# Patient Record
Sex: FEMALE | Race: WHITE | Employment: UNEMPLOYED | URBAN - METROPOLITAN AREA
[De-identification: names, ages, dates, MRNs, and addresses within clinical notes are randomized per-mention and may not be internally consistent; named-entity substitution may affect disease eponyms.]

---

## 2018-01-19 ENCOUNTER — HOSPITAL ENCOUNTER (EMERGENCY)
Facility: CLINIC | Age: 43
Discharge: HOME | End: 2018-01-20
Attending: EMERGENCY MEDICINE | Admitting: EMERGENCY MEDICINE
Payer: COMMERCIAL

## 2018-01-19 DIAGNOSIS — R07.89 NON-CARDIAC CHEST PAIN: ICD-10-CM

## 2018-01-19 PROCEDURE — 99283 EMERGENCY DEPT VISIT LOW MDM: CPT

## 2018-01-19 PROCEDURE — 93005 ELECTROCARDIOGRAM TRACING: CPT

## 2018-01-19 NOTE — ED AVS SNAPSHOT
[facility] Emergency Department    [address]    [facility] [address]    Phone:  [phone]    Fax:  [phone]                                       Amanda Cox   MRN: [id number]    Department:  [facility] Emergency Department   Date of Visit:  1/19/2018           Patient Information     Date Of Birth          1975        Your diagnoses for this visit were:     Non-cardiac chest pain        You were seen by Hammad Sherwood MD.      Follow-up Information     Follow up with Suyapa Lieberman DO.    Why:  As needed    Contact information:    [facility]  [address]  [facility] [address]  [phone]          Discharge Instructions       Discharge Instructions  Chest Pain    You have been seen today for chest pain or discomfort.  At this time, your provider has found no signs that your chest pain is due to a serious or life-threatening condition, (or you have declined more testing and/or admission to the hospital). However, sometimes there is a serious problem that does not show up right away. Your evaluation today may not be complete and you may need further testing and evaluation.     Generally, every Emergency Department visit should have a follow-up clinic visit with either a primary or a specialty clinic/provider. Please follow-up as instructed by your emergency provider today.  Return to the Emergency Department if:    Your chest pain changes, gets worse, starts to happen more often, or comes with less activity.    You are newly short of breath.    You get very weak or tired.    You pass out or faint.    You have any new symptoms, like fever, cough, numb legs, or you cough up blood.    You have anything else that worries you.    Until you follow-up with your regular provider, please do the following:    Take one aspirin daily unless you have an allergy or are told not to by your provider.    If a stress test appointment has been made, go to  the appointment.    If you have questions, contact your regular provider.    Follow-up with your regular provider/clinic as directed; this is very important.    If you were given a prescription for medicine here today, be sure to read all of the information (including the package insert) that comes with your prescription.  This will include important information about the medicine, its side effects, and any warnings that you need to know about.  The pharmacist who fills the prescription can provide more information and answer questions you may have about the medicine.  If you have questions or concerns that the pharmacist cannot address, please call or return to the Emergency Department.       Remember that you can always come back to the Emergency Department if you are not able to see your regular provider in the amount of time listed above, if you get any new symptoms, or if there is anything that worries you.      24 Hour Appointment Hotline       To make an appointment at any [facility], call [phone] ([phone]). If you don't have a family doctor or clinic, we will help you find one. [facility] are conveniently located to serve the needs of you and your family.             Review of your medicines      Notice     You have not been prescribed any medications.            Procedures and tests performed during your visit     Basic metabolic panel    CBC with platelets differential    Cardiac Continuous Monitoring    D dimer quantitative    EKG 12 lead    Peripheral IV: Standard    Pulse oximetry nursing    Troponin I      Orders Needing Specimen Collection     None      Pending Results     No orders found for last 3 day(s).            Pending Culture Results     No orders found for last 3 day(s).            Pending Results Instructions     If you had any lab results that were not finalized at the time of your Discharge, you can call the ED Lab Result RN at [phone]. You will be contacted  by this team for any positive Lab results or changes in treatment. The nurses are available 7 days a week from 10A to 6:30P.  You can leave a message 24 hours per day and they will return your call.        Test Results From Your Hospital Stay        1/20/2018 12:30 AM      Component Results     Component Value Ref Range & Units Status    WBC 7.4 4.0 - 11.0 10e9/L Final    RBC Count 4.12 3.8 - 5.2 10e12/L Final    Hemoglobin 12.0 11.7 - 15.7 g/dL Final    Hematocrit 37.1 35.0 - 47.0 % Final    MCV 90 78 - 100 fl Final    MCH 29.1 26.5 - 33.0 pg Final    MCHC 32.3 31.5 - 36.5 g/dL Final    RDW 13.5 10.0 - 15.0 % Final    Platelet Count 199 150 - 450 10e9/L Final    Diff Method Automated Method  Final    % Neutrophils 63.8 % Final    % Lymphocytes 26.0 % Final    % Monocytes 8.8 % Final    % Eosinophils 0.8 % Final    % Basophils 0.3 % Final    % Immature Granulocytes 0.3 % Final    Nucleated RBCs 0 0 /100 Final    Absolute Neutrophil 4.7 1.6 - 8.3 10e9/L Final    Absolute Lymphocytes 1.9 0.8 - 5.3 10e9/L Final    Absolute Monocytes 0.7 0.0 - 1.3 10e9/L Final    Absolute Eosinophils 0.1 0.0 - 0.7 10e9/L Final    Absolute Basophils 0.0 0.0 - 0.2 10e9/L Final    Abs Immature Granulocytes 0.0 0 - 0.4 10e9/L Final    Absolute Nucleated RBC 0.0  Final         1/20/2018 12:49 AM      Component Results     Component Value Ref Range & Units Status    Sodium 139 133 - 144 mmol/L Final    Potassium 3.6 3.4 - 5.3 mmol/L Final    Chloride 106 94 - 109 mmol/L Final    Carbon Dioxide 27 20 - 32 mmol/L Final    Anion Gap 6 3 - 14 mmol/L Final    Glucose 97 70 - 99 mg/dL Final    Urea Nitrogen 15 7 - 30 mg/dL Final    Creatinine 0.61 0.52 - 1.04 mg/dL Final    GFR Estimate >90 >60 mL/min/1.7m2 Final    Non  GFR Calc    GFR Estimate If Black >90 >60 mL/min/1.7m2 Final    African American GFR Calc    Calcium 9.0 8.5 - 10.1 mg/dL Final         1/20/2018 12:49 AM      Component Results     Component Value Ref Range & Units  Status    Troponin I ES <0.015 0.000 - 0.045 ug/L Final    The 99th percentile for upper reference range is 0.045 ug/L.  Troponin values   in the range of 0.045 - 0.120 ug/L may be associated with risks of adverse   clinical events.           1/20/2018 12:47 AM      Component Results     Component Value Ref Range & Units Status    D Dimer <0.3 0.0 - 0.50 ug/ml FEU Final    This D-dimer assay is intended for use in conjunction with a clinical pretest   probability assessment model to exclude pulmonary embolism (PE) and deep   venous thrombosis (DVT) in outpatients suspected of PE or DVT. The cut-off   value is 0.5 ug/mL FEU.                  Clinical Quality Measure: Blood Pressure Screening     Your blood pressure was checked while you were in the emergency department today. The last reading we obtained was  BP: (!) 137/97 . Please read the guidelines below about what these numbers mean and what you should do about them.  If your systolic blood pressure (the top number) is less than 120 and your diastolic blood pressure (the bottom number) is less than 80, then your blood pressure is normal. There is nothing more that you need to do about it.  If your systolic blood pressure (the top number) is 120-139 or your diastolic blood pressure (the bottom number) is 80-89, your blood pressure may be higher than it should be. You should have your blood pressure rechecked within a year by a primary care provider.  If your systolic blood pressure (the top number) is 140 or greater or your diastolic blood pressure (the bottom number) is 90 or greater, you may have high blood pressure. High blood pressure is treatable, but if left untreated over time it can put you at risk for heart attack, stroke, or kidney failure. You should have your blood pressure rechecked by a primary care provider within the next 4 weeks.  If your provider in the emergency department today gave you specific instructions to follow-up with your doctor or  "provider even sooner than that, you should follow that instruction and not wait for up to 4 weeks for your follow-up visit.        Thank you for choosing [facility]       Thank you for choosing [facility] for your care. Our goal is always to provide you with excellent care. Hearing back from our patients is one way we can continue to improve our services. Please take a few minutes to complete the written survey that you may receive in the mail after you visit with us. Thank you!        [employer] Information     [facility] lets you send messages to your doctor, view your test results, renew your prescriptions, schedule appointments and more. To sign up, go to [email or web address] . Click on \"Log in\" on the left side of the screen, which will take you to the Welcome page. Then click on \"Sign up Now\" on the right side of the page.     You will be asked to enter the access code listed below, as well as some personal information. Please follow the directions to create your username and password.     Your access code is: [email or web address]  Expires: 2018  1:22 AM     Your access code will  in 90 days. If you need help or a new code, please call your [facility] clinic or [phone].        Care EveryWhere ID     This is your Care EveryWhere ID. This could be used by other organizations to access your [facility] medical records  [id number]        Equal Access to Services     CLARISSE PEREZ AH: Hadii leigha Barton, waaxda luqadaha, qaybta kaalcory wilson. So [facility] [phone].    ATENCIÓN: Si habla español, tiene a mae disposición servicios gratuitos de asistencia lingüística. John al [phone].    We comply with applicable federal civil rights laws and [place] laws. We do not discriminate on the basis of race, color, national origin, age, disability, sex, sexual orientation, or gender identity.            After Visit Summary       This is your record. Keep this with " you and show to your community pharmacist(s) and doctor(s) at your next visit.

## 2018-01-19 NOTE — ED AVS SNAPSHOT
[facility] Emergency Department    [address]    [facility] [address]    Phone:  [phone]    Fax:  [phone]                                       Amanda Cox   MRN: [id number]    Department:  [facility] Emergency Department   Date of Visit:  1/19/2018           After Visit Summary Signature Page     I have received my discharge instructions, and my questions have been answered. I have discussed any challenges I see with this plan with the nurse or doctor.    ..........................................................................................................................................  Patient/Patient Representative Signature      ..........................................................................................................................................  Patient Representative Print Name and Relationship to Patient    ..................................................               ................................................  Date                                            Time    ..........................................................................................................................................  Reviewed by Signature/Title    ...................................................              ..............................................  Date                                                            Time

## 2018-01-20 VITALS
OXYGEN SATURATION: 98 % | WEIGHT: 147 LBS | TEMPERATURE: 98.2 F | RESPIRATION RATE: 18 BRPM | SYSTOLIC BLOOD PRESSURE: 137 MMHG | HEART RATE: 71 BPM | DIASTOLIC BLOOD PRESSURE: 97 MMHG

## 2018-01-20 LAB
ANION GAP SERPL CALCULATED.3IONS-SCNC: 6 MMOL/L (ref 3–14)
BASOPHILS # BLD AUTO: 0 10E9/L (ref 0–0.2)
BASOPHILS NFR BLD AUTO: 0.3 %
BUN SERPL-MCNC: 15 MG/DL (ref 7–30)
CALCIUM SERPL-MCNC: 9 MG/DL (ref 8.5–10.1)
CHLORIDE SERPL-SCNC: 106 MMOL/L (ref 94–109)
CO2 SERPL-SCNC: 27 MMOL/L (ref 20–32)
CREAT SERPL-MCNC: 0.61 MG/DL (ref 0.52–1.04)
D DIMER PPP FEU-MCNC: <0.3 UG/ML FEU (ref 0–0.5)
DIFFERENTIAL METHOD BLD: NORMAL
EOSINOPHIL # BLD AUTO: 0.1 10E9/L (ref 0–0.7)
EOSINOPHIL NFR BLD AUTO: 0.8 %
ERYTHROCYTE [DISTWIDTH] IN BLOOD BY AUTOMATED COUNT: 13.5 % (ref 10–15)
GFR SERPL CREATININE-BSD FRML MDRD: >90 ML/MIN/1.7M2
GLUCOSE SERPL-MCNC: 97 MG/DL (ref 70–99)
HCT VFR BLD AUTO: 37.1 % (ref 35–47)
HGB BLD-MCNC: 12 G/DL (ref 11.7–15.7)
IMM GRANULOCYTES # BLD: 0 10E9/L (ref 0–0.4)
IMM GRANULOCYTES NFR BLD: 0.3 %
LYMPHOCYTES # BLD AUTO: 1.9 10E9/L (ref 0.8–5.3)
LYMPHOCYTES NFR BLD AUTO: 26 %
MCH RBC QN AUTO: 29.1 PG (ref 26.5–33)
MCHC RBC AUTO-ENTMCNC: 32.3 G/DL (ref 31.5–36.5)
MCV RBC AUTO: 90 FL (ref 78–100)
MONOCYTES # BLD AUTO: 0.7 10E9/L (ref 0–1.3)
MONOCYTES NFR BLD AUTO: 8.8 %
NEUTROPHILS # BLD AUTO: 4.7 10E9/L (ref 1.6–8.3)
NEUTROPHILS NFR BLD AUTO: 63.8 %
NRBC # BLD AUTO: 0 10*3/UL
NRBC BLD AUTO-RTO: 0 /100
PLATELET # BLD AUTO: 199 10E9/L (ref 150–450)
POTASSIUM SERPL-SCNC: 3.6 MMOL/L (ref 3.4–5.3)
RBC # BLD AUTO: 4.12 10E12/L (ref 3.8–5.2)
SODIUM SERPL-SCNC: 139 MMOL/L (ref 133–144)
TROPONIN I SERPL-MCNC: <0.015 UG/L (ref 0–0.04)
WBC # BLD AUTO: 7.4 10E9/L (ref 4–11)

## 2018-01-20 PROCEDURE — 80048 BASIC METABOLIC PNL TOTAL CA: CPT | Performed by: EMERGENCY MEDICINE

## 2018-01-20 PROCEDURE — 85379 FIBRIN DEGRADATION QUANT: CPT | Performed by: EMERGENCY MEDICINE

## 2018-01-20 PROCEDURE — 84484 ASSAY OF TROPONIN QUANT: CPT | Performed by: EMERGENCY MEDICINE

## 2018-01-20 PROCEDURE — 85025 COMPLETE CBC W/AUTO DIFF WBC: CPT | Performed by: EMERGENCY MEDICINE

## 2018-01-20 ASSESSMENT — ENCOUNTER SYMPTOMS
FEVER: 0
NUMBNESS: 1
SHORTNESS OF BREATH: 0

## 2018-01-20 NOTE — ED NOTES
42-year-old female presents to the ER with complaints of chest pain that has been on and off for the last couple of days but now the pain is down in her left arm.

## 2018-01-20 NOTE — ED PROVIDER NOTES
"  History     Chief Complaint:  Chest Pain    HPI   Amanda Cox is a 42 year old female with a history of hypertension who presents with chest pain.   The patient states that after waking up Monday morning she developed intermittent chest pain that lasts for a few hours and has been progressively worsening until today where it is more persistent and sharp. Three hours prior to presentation, the patient reports that her chest pain increased significantly with radiation down her left arm. The patient reports her pain across her chest bilaterally with a pain scale of 6/10. She reports to have tried yoga to help the pain pass. She also reports to have a \"chronic loss of circulation in her fingers\" that is making her hands and feet feel numb today. The patient denies fever, difficulty breathing, shortness of breath, and any swelling.     Allergies:  Codeine     Medications:    The patient is currently on no regular medications.    Past Medical History:    PFO  Hypertension  Anxiety  TIA    Past Surgical History:    History reviewed. No pertinent surgical history.    Family History:    History reviewed. No pertinent family history.      Social History:  Tobacco Status: Never  Alcohol Use: No     Review of Systems   Constitutional: Negative for fever.   Respiratory: Negative for shortness of breath.    Cardiovascular: Positive for chest pain. Negative for leg swelling (denies any extremity swelling).   Neurological: Positive for numbness (hands and feet).   All other systems reviewed and are negative.    Physical Exam     Patient Vitals for the past 24 hrs:   BP Temp Temp src Pulse Heart Rate Resp SpO2 Weight   01/20/18 0100 (!) 137/97 - - - 76 - 98 % -   01/19/18 2251 (!) 155/107 98.2  F (36.8  C) Temporal 71 - 18 99 % 66.7 kg (147 lb)       Physical Exam  Nursing note and vitals reviewed.  Constitutional: Cooperative.   HENT:   Mouth/Throat: Mucous membranes are normal. No JVD  Cardiovascular: Normal rate, regular " rhythm and normal heart sounds.  No murmur.  Pulmonary/Chest: Effort normal and breath sounds normal. No respiratory distress. No wheezes. No rales.   Abdominal: Soft. Normal appearance and bowel sounds are normal. No distension. There is no tenderness. There is no rigidity and no guarding.   Musculoskeletal: No LE edema  Neurological: Alert.   Skin: Skin is warm and dry. No rash noted.   Psychiatric: Normal mood and affect.       Emergency Department Course     ECG (22:58:33):  Rate 62 bpm. IL interval 148. QRS duration 82. QT/QTc 418/424. P-R-T axes 59 70 37. Interpretation: Normal Sinus Rhythm. Interpreted at 2301 by Hammad Sherwood MD on 1/19/2018+.    Laboratory:  0009 - Troponin: <0.015   D-dimer: <0.3    CBC: WNL (WBC 7.4, HGB 12.0, )    BMP: WNL (Creatinine 0.61)     Emergency Department Course:  Past medical records, nursing notes, and vitals reviewed.  2334: I performed an exam of the patient and obtained history, as documented above.   IV inserted and blood drawn.     0116: I rechecked the patient. Findings and plan explained to the Patient. Patient discharged home with instructions regarding supportive care, medications, and reasons to return. The importance of close follow-up was reviewed.      Impression & Plan      Medical Decision Making:  Amanda Cox is a 42 year old female with a history of TIA and a known PFO presents with intermittent chest pain. Story is nonconcerning as it is non exertional or typical of cardiac related etiologies. Her dimer is negative, and this lower risk patient essentially rules out pulmonary embolism. She did have a preceding upper respiratory infection and may simply be  having inflammation of chest wall or pleurisy. No indication for chest imaging at this time. Troponin and EKG are reassuring. Exam is unremarkable. No murmurs, rubs or concern clinically for effusion or pericarditis. At this time she is stable for discharge and will follow up with her regular  physician.     Diagnosis:    ICD-10-CM    1. Non-cardiac chest pain R07.89      Lukas Conte  1/19/2018   [facility] EMERGENCY DEPARTMENT  I, Lukas Conte, am serving as a scribe at 11:34 PM on 1/19/2018 to document services personally performed by Hammad Sherwood MD based on my observations and the provider's statements to me.         Hammad Sherwood MD  01/20/18 0606

## 2018-01-21 LAB — INTERPRETATION ECG - MUSE: NORMAL

## 2018-02-20 ENCOUNTER — HOSPITAL ENCOUNTER (EMERGENCY)
Facility: CLINIC | Age: 43
Discharge: HOME | End: 2018-02-20
Attending: EMERGENCY MEDICINE | Admitting: EMERGENCY MEDICINE
Payer: COMMERCIAL

## 2018-02-20 ENCOUNTER — APPOINTMENT (OUTPATIENT)
Dept: GENERAL RADIOLOGY | Facility: CLINIC | Age: 43
End: 2018-02-20
Attending: EMERGENCY MEDICINE
Payer: COMMERCIAL

## 2018-02-20 VITALS
SYSTOLIC BLOOD PRESSURE: 128 MMHG | TEMPERATURE: 98.3 F | BODY MASS INDEX: 24.11 KG/M2 | HEART RATE: 70 BPM | RESPIRATION RATE: 18 BRPM | OXYGEN SATURATION: 100 % | HEIGHT: 66 IN | WEIGHT: 150 LBS | DIASTOLIC BLOOD PRESSURE: 90 MMHG

## 2018-02-20 DIAGNOSIS — R07.9 ACUTE CHEST PAIN: ICD-10-CM

## 2018-02-20 LAB
ANION GAP SERPL CALCULATED.3IONS-SCNC: 6 MMOL/L (ref 3–14)
BASOPHILS # BLD AUTO: 0 10E9/L (ref 0–0.2)
BASOPHILS NFR BLD AUTO: 0.3 %
BUN SERPL-MCNC: 17 MG/DL (ref 7–30)
CALCIUM SERPL-MCNC: 9 MG/DL (ref 8.5–10.1)
CHLORIDE SERPL-SCNC: 104 MMOL/L (ref 94–109)
CO2 SERPL-SCNC: 28 MMOL/L (ref 20–32)
CREAT SERPL-MCNC: 0.62 MG/DL (ref 0.52–1.04)
D DIMER PPP FEU-MCNC: <0.3 UG/ML FEU (ref 0–0.5)
DIFFERENTIAL METHOD BLD: NORMAL
EOSINOPHIL # BLD AUTO: 0.1 10E9/L (ref 0–0.7)
EOSINOPHIL NFR BLD AUTO: 0.6 %
ERYTHROCYTE [DISTWIDTH] IN BLOOD BY AUTOMATED COUNT: 12.8 % (ref 10–15)
GFR SERPL CREATININE-BSD FRML MDRD: >90 ML/MIN/1.7M2
GLUCOSE SERPL-MCNC: 94 MG/DL (ref 70–99)
HCT VFR BLD AUTO: 37.1 % (ref 35–47)
HGB BLD-MCNC: 11.9 G/DL (ref 11.7–15.7)
IMM GRANULOCYTES # BLD: 0 10E9/L (ref 0–0.4)
IMM GRANULOCYTES NFR BLD: 0.3 %
LYMPHOCYTES # BLD AUTO: 1.4 10E9/L (ref 0.8–5.3)
LYMPHOCYTES NFR BLD AUTO: 14.9 %
MCH RBC QN AUTO: 28.5 PG (ref 26.5–33)
MCHC RBC AUTO-ENTMCNC: 32.1 G/DL (ref 31.5–36.5)
MCV RBC AUTO: 89 FL (ref 78–100)
MONOCYTES # BLD AUTO: 0.8 10E9/L (ref 0–1.3)
MONOCYTES NFR BLD AUTO: 8.1 %
NEUTROPHILS # BLD AUTO: 7 10E9/L (ref 1.6–8.3)
NEUTROPHILS NFR BLD AUTO: 75.8 %
NRBC # BLD AUTO: 0 10*3/UL
NRBC BLD AUTO-RTO: 0 /100
PLATELET # BLD AUTO: 228 10E9/L (ref 150–450)
POTASSIUM SERPL-SCNC: 3.9 MMOL/L (ref 3.4–5.3)
RBC # BLD AUTO: 4.17 10E12/L (ref 3.8–5.2)
SODIUM SERPL-SCNC: 138 MMOL/L (ref 133–144)
TROPONIN I SERPL-MCNC: <0.015 UG/L (ref 0–0.04)
WBC # BLD AUTO: 9.3 10E9/L (ref 4–11)

## 2018-02-20 PROCEDURE — 84484 ASSAY OF TROPONIN QUANT: CPT | Performed by: EMERGENCY MEDICINE

## 2018-02-20 PROCEDURE — 71046 X-RAY EXAM CHEST 2 VIEWS: CPT

## 2018-02-20 PROCEDURE — 25000132 ZZH RX MED GY IP 250 OP 250 PS 637: Performed by: EMERGENCY MEDICINE

## 2018-02-20 PROCEDURE — 85025 COMPLETE CBC W/AUTO DIFF WBC: CPT | Performed by: EMERGENCY MEDICINE

## 2018-02-20 PROCEDURE — 99285 EMERGENCY DEPT VISIT HI MDM: CPT | Mod: 25

## 2018-02-20 PROCEDURE — 93005 ELECTROCARDIOGRAM TRACING: CPT

## 2018-02-20 PROCEDURE — 85379 FIBRIN DEGRADATION QUANT: CPT | Performed by: EMERGENCY MEDICINE

## 2018-02-20 PROCEDURE — 80048 BASIC METABOLIC PNL TOTAL CA: CPT | Performed by: EMERGENCY MEDICINE

## 2018-02-20 RX ORDER — PROPRANOLOL HYDROCHLORIDE 80 MG/1
80 CAPSULE, EXTENDED RELEASE ORAL
COMMUNITY
Start: 2017-08-14 | End: 2019-05-09

## 2018-02-20 RX ORDER — ASPIRIN 81 MG/1
324 TABLET, CHEWABLE ORAL ONCE
Status: DISCONTINUED | OUTPATIENT
Start: 2018-02-20 | End: 2018-02-20 | Stop reason: HOSPADM

## 2018-02-20 RX ORDER — NITROGLYCERIN 0.4 MG/1
0.4 TABLET SUBLINGUAL EVERY 5 MIN PRN
Status: DISCONTINUED | OUTPATIENT
Start: 2018-02-20 | End: 2018-02-20 | Stop reason: HOSPADM

## 2018-02-20 RX ADMIN — NITROGLYCERIN 0.4 MG: 0.4 TABLET SUBLINGUAL at 17:29

## 2018-02-20 ASSESSMENT — ENCOUNTER SYMPTOMS
SHORTNESS OF BREATH: 1
NAUSEA: 1
WEAKNESS: 1
ABDOMINAL PAIN: 0
VOMITING: 0
NECK PAIN: 1

## 2018-02-20 NOTE — ED AVS SNAPSHOT
[facility] Emergency Department    [address]    [facility] [address]    Phone:  [phone]    Fax:  [phone]                                       Amanda Cox   MRN: [id number]    Department:  [facility] Emergency Department   Date of Visit:  2/20/2018           Patient Information     Date Of Birth          1975        Your diagnoses for this visit were:     Acute chest pain        You were seen by Flavia Krueger MD.      Follow-up Information     Follow up with Suyapa Lieberman DO. Schedule an appointment as soon as possible for a visit in 1 week.    Contact information:    [facility]  [address]  [facility] [id number] [phone]          Follow up with cardiology appointment. Go in 1 day.        Discharge Instructions       Discharge Instructions  Chest Pain    You have been seen today for chest pain or discomfort.  At this time, your provider has found no signs that your chest pain is due to a serious or life-threatening condition, (or you have declined more testing and/or admission to the hospital). However, sometimes there is a serious problem that does not show up right away. Your evaluation today may not be complete and you may need further testing and evaluation.     Generally, every Emergency Department visit should have a follow-up clinic visit with either a primary or a specialty clinic/provider. Please follow-up as instructed by your emergency provider today.  Return to the Emergency Department if:    Your chest pain changes, gets worse, starts to happen more often, or comes with less activity.    You are newly short of breath.    You get very weak or tired.    You pass out or faint.    You have any new symptoms, like fever, cough, numb legs, or you cough up blood.    You have anything else that worries you.    Until you follow-up with your regular provider, please do the following:    Take one aspirin daily unless  you have an allergy or are told not to by your provider.    If a stress test appointment has been made, go to the appointment.    If you have questions, contact your regular provider.    Follow-up with your regular provider/clinic as directed; this is very important.    If you were given a prescription for medicine here today, be sure to read all of the information (including the package insert) that comes with your prescription.  This will include important information about the medicine, its side effects, and any warnings that you need to know about.  The pharmacist who fills the prescription can provide more information and answer questions you may have about the medicine.  If you have questions or concerns that the pharmacist cannot address, please call or return to the Emergency Department.       Remember that you can always come back to the Emergency Department if you are not able to see your regular provider in the amount of time listed above, if you get any new symptoms, or if there is anything that worries you.      24 Hour Appointment Hotline       To make an appointment at any [facility], call [phone] ([phone]). If you don't have a family doctor or clinic, we will help you find one. [facility] are conveniently located to serve the needs of you and your family.             Review of your medicines      Our records show that you are taking the medicines listed below. If these are incorrect, please call your family doctor or clinic.        Dose / Directions Last dose taken    IBUPROFEN PO        Refills:  0        propranolol 80 MG 24 hr capsule   Commonly known as:  INDERAL LA   Dose:  80 mg        Take 80 mg by mouth   Refills:  0        TYLENOL PO        Refills:  0                Procedures and tests performed during your visit     Basic metabolic panel    CBC with platelets differential    D dimer quantitative    EKG 12 lead    Troponin I    XR Chest 2 Views      Orders Needing  Specimen Collection     None      Pending Results     No orders found from 2/18/2018 to 2/21/2018.            Pending Culture Results     No orders found from 2/18/2018 to 2/21/2018.            Pending Results Instructions     If you had any lab results that were not finalized at the time of your Discharge, you can call the ED Lab Result RN at [phone]. You will be contacted by this team for any positive Lab results or changes in treatment. The nurses are available 7 days a week from 10A to 6:30P.  You can leave a message 24 hours per day and they will return your call.        Test Results From Your Hospital Stay        2/20/2018  5:08 PM      Component Results     Component Value Ref Range & Units Status    WBC 9.3 4.0 - 11.0 10e9/L Final    RBC Count 4.17 3.8 - 5.2 10e12/L Final    Hemoglobin 11.9 11.7 - 15.7 g/dL Final    Hematocrit 37.1 35.0 - 47.0 % Final    MCV 89 78 - 100 fl Final    MCH 28.5 26.5 - 33.0 pg Final    MCHC 32.1 31.5 - 36.5 g/dL Final    RDW 12.8 10.0 - 15.0 % Final    Platelet Count 228 150 - 450 10e9/L Final    Diff Method Automated Method  Final    % Neutrophils 75.8 % Final    % Lymphocytes 14.9 % Final    % Monocytes 8.1 % Final    % Eosinophils 0.6 % Final    % Basophils 0.3 % Final    % Immature Granulocytes 0.3 % Final    Nucleated RBCs 0 0 /100 Final    Absolute Neutrophil 7.0 1.6 - 8.3 10e9/L Final    Absolute Lymphocytes 1.4 0.8 - 5.3 10e9/L Final    Absolute Monocytes 0.8 0.0 - 1.3 10e9/L Final    Absolute Eosinophils 0.1 0.0 - 0.7 10e9/L Final    Absolute Basophils 0.0 0.0 - 0.2 10e9/L Final    Abs Immature Granulocytes 0.0 0 - 0.4 10e9/L Final    Absolute Nucleated RBC 0.0  Final         2/20/2018  5:25 PM      Component Results     Component Value Ref Range & Units Status    Sodium 138 133 - 144 mmol/L Final    Potassium 3.9 3.4 - 5.3 mmol/L Final    Chloride 104 94 - 109 mmol/L Final    Carbon Dioxide 28 20 - 32 mmol/L Final    Anion Gap 6 3 - 14 mmol/L Final    Glucose 94 70  - 99 mg/dL Final    Urea Nitrogen 17 7 - 30 mg/dL Final    Creatinine 0.62 0.52 - 1.04 mg/dL Final    GFR Estimate >90 >60 mL/min/1.7m2 Final    Non  GFR Calc    GFR Estimate If Black >90 >60 mL/min/1.7m2 Final    African American GFR Calc    Calcium 9.0 8.5 - 10.1 mg/dL Final         2/20/2018  5:25 PM      Component Results     Component Value Ref Range & Units Status    Troponin I ES <0.015 0.000 - 0.045 ug/L Final    The 99th percentile for upper reference range is 0.045 ug/L.  Troponin values   in the range of 0.045 - 0.120 ug/L may be associated with risks of adverse   clinical events.           2/20/2018  5:46 PM      Component Results     Component Value Ref Range & Units Status    D Dimer <0.3 0.0 - 0.50 ug/ml FEU Final    This D-dimer assay is intended for use in conjunction with a clinical pretest   probability assessment model to exclude pulmonary embolism (PE) and deep   venous thrombosis (DVT) in outpatients suspected of PE or DVT. The cut-off   value is 0.5 ug/mL FEU.           2/20/2018  6:36 PM      Narrative     XR CHEST 2 VW 2/20/2018 6:26 PM    COMPARISON: None.    HISTORY: Shortness of breath, chest pain.        Impression     IMPRESSION: Cardiac silhouette and pulmonary vasculature are within  normal limits. No focal airspace disease, pleural effusion or  pneumothorax.    VISHAL HOLLIS MD                Clinical Quality Measure: Blood Pressure Screening     Your blood pressure was checked while you were in the emergency department today. The last reading we obtained was  BP: 128/90 . Please read the guidelines below about what these numbers mean and what you should do about them.  If your systolic blood pressure (the top number) is less than 120 and your diastolic blood pressure (the bottom number) is less than 80, then your blood pressure is normal. There is nothing more that you need to do about it.  If your systolic blood pressure (the top number) is 120-139 or your diastolic  "blood pressure (the bottom number) is 80-89, your blood pressure may be higher than it should be. You should have your blood pressure rechecked within a year by a primary care provider.  If your systolic blood pressure (the top number) is 140 or greater or your diastolic blood pressure (the bottom number) is 90 or greater, you may have high blood pressure. High blood pressure is treatable, but if left untreated over time it can put you at risk for heart attack, stroke, or kidney failure. You should have your blood pressure rechecked by a primary care provider within the next 4 weeks.  If your provider in the emergency department today gave you specific instructions to follow-up with your doctor or provider even sooner than that, you should follow that instruction and not wait for up to 4 weeks for your follow-up visit.        Thank you for choosing [facility]       Thank you for choosing [facility] for your care. Our goal is always to provide you with excellent care. Hearing back from our patients is one way we can continue to improve our services. Please take a few minutes to complete the written survey that you may receive in the mail after you visit with us. Thank you!        [employer] Information     [employer] lets you send messages to your doctor, view your test results, renew your prescriptions, schedule appointments and more. To sign up, go to [email or web address] . Click on \"Log in\" on the left side of the screen, which will take you to the Welcome page. Then click on \"Sign up Now\" on the right side of the page.     You will be asked to enter the access code listed below, as well as some personal information. Please follow the directions to create your username and password.     Your access code is: [email or web address]  Expires: 2018  1:22 AM     Your access code will  in 90 days. If you need help or a new code, please call your [facility] clinic or [phone].        Care EveryWhere ID     This is your Care " EveryWhere ID. This could be used by other organizations to access your [facility] medical records  [id number]        Equal Access to Services     CLARISSE PEREZ : Junaid Barton, gabriella mario, tina luna, cory horan. So [facility] [phone].    ATENCIÓN: Si habla español, tiene a mae disposición servicios gratuitos de asistencia lingüística. Llame al [phone].    We comply with applicable federal civil rights laws and [place] laws. We do not discriminate on the basis of race, color, national origin, age, disability, sex, sexual orientation, or gender identity.            After Visit Summary       This is your record. Keep this with you and show to your community pharmacist(s) and doctor(s) at your next visit.

## 2018-02-20 NOTE — ED PROVIDER NOTES
History     Chief Complaint:  Chest Pain    HPI   Amanda Cox is a 42 year old female who with a history of hypertension who presents to the ED for evaluation of chest pain. The patient reports that she has had 5 episodes of a sharp, left sided chest pain over the last month that radiates to her left neck and left arm. She has been seen here, and has had a negative workup.  She has an appointment with a cardiologist tomorrow at [facility]. 3 days ago at 0300, she had another episode. Her pain has been constant since then, so she presented to the ED. After this episode, she is also now having some left arm weakness, nausea, and shortness of breath. Her pain does not worsen with deep breathing or exertion. Of note, her episodes last 1-2 hours, which are followed by a dull, achy pain. She denies any leg swelling, rashes, abdominal pain, or vomiting. Not much improvement with change in position. She has been alternating tylenol and Advil, without relief.     Cardiac/PE/DVT Risk Factors:  The patient has a history of hypertension. The patient denies any personal or familial history of PE, DVT, or clotting disorder. The patient reports no recent travel, surgery, or other immobilizations. She is not on any birth control.    Allergies:  Codeine    Medications:    The patient is currently on no regular medications.    Past Medical History:    HTN  Ovarian cyst  PFO  TIA    Past Surgical History:    Gyn surgery    Family History:    No past pertinent family history.    Social History:  Marital Status:   [2]  Negative for tobacco use.  Rare alcohol use    Review of Systems   Respiratory: Positive for shortness of breath.    Cardiovascular: Positive for chest pain. Negative for leg swelling.   Gastrointestinal: Positive for nausea. Negative for abdominal pain and vomiting.   Musculoskeletal: Positive for neck pain.   Skin: Negative for rash.   Neurological: Positive for weakness.   All other systems reviewed and are  "negative.    Physical Exam     Patient Vitals for the past 24 hrs:   BP Temp Temp src Pulse Heart Rate Resp SpO2 Height Weight   02/20/18 1812 - - - - - - 99 % - -   02/20/18 1811 - - - - - - 100 % - -   02/20/18 1756 128/90 - - - 55 - - - -   02/20/18 1755 - - - - - - 100 % - -   02/20/18 1654 (!) 159/95 98.3  F (36.8  C) Oral 70 70 18 100 % 1.676 m (5' 6\") 68 kg (150 lb)     Physical Exam  Constitutional: Alert, attentive, GCS 15, young woman in no acute distress  HENT:    Nose: Nose normal.    Mouth/Throat: Oropharynx is clear, mucous membranes are moist   Eyes: Normal conjunctiva. Pupils are equal, round, and reactive to light.   CV: regular rate and rhythm; no murmurs, rubs or gallups  Chest: Effort normal and breath sounds normal. Reproducible chest wall tenderness in lower sternum in midline.  No rash noted.    GI:  There is no tenderness with deep palpation. No distension. Normal bowel sounds  MSK: Normal range of motion, no peripheral edema, no calf tenderness to palpation.   Neurological: Alert, attentive, oriented x4, strength intact  Skin: Skin is warm and dry.    Emergency Department Course   ECG:  Indication: Chest Pain  Time: 1652  Vent. Rate 62 bpm. LA interval 162. QRS duration 82. QT/QTc 398/403. P-R-T axis 70 77 61.  Normal sinus rhythm. No significant change compared to EKG dated 1/19/18. Read time: 1654    Imaging:  Radiographic findings were communicated with the patient who voiced understanding of the findings.  XR Chest 2 views:   Cardiac silhouette and pulmonary vasculature are within  normal limits. No focal airspace disease, pleural effusion or pneumothorax, as per radiology.     Laboratory:  CBC: WBC: 9.3, HGB: 11.9, PLT: 228  BMP: All WNL (Creatinine: 0.62)    1725 Troponin: <0.015    D dimer: <0.3    Interventions:  1729 Nitroglycerin 0.4 mg PO    Emergency Department Course:  Nursing notes and vitals reviewed. (2773) I performed an exam of the patient as documented above.     IV " inserted. Medicine administered as documented above. Blood drawn. This was sent to the lab for further testing, results above.    The patient was sent for a Chest XR while in the emergency department, findings above.     EKG obtained in the ED, see results above.     (1838) I rechecked the patient and discussed the results of her workup thus far.     Findings and plan explained to the Patient. Patient discharged home with instructions regarding supportive care, medications, and reasons to return. The importance of close follow-up was reviewed.    I personally reviewed the laboratory results with the Patient and answered all related questions prior to discharge.     Impression & Plan      Medical Decision Making:  Amanda Cox is a 42 year old female with history of TIA, PFO, and HTN, who presents with constant chest pain for the past 3 days.  Differential diagnosis includes acute coronary syndrome, angina, pulmonary embolism, pneumonia, pneumothorax, costochondritis, myocarditis, pericarditis.  EKG is reassuring without any acute ST segment changes.  Her troponin level is normal.  I feel a single troponin level is adequate as she has had constant chest pain for the past 3 days.  She is a low risk for pulmonary embolism by Wells score and d-dimer is negative.  Therefore CT chest was not obtained.  Chest x-ray does not show any evidence of pneumonia or pneumothorax.  Patient had improvement of symptoms with nitroglycerin.  She is chest pain-free.  She has a follow-up appointment tomorrow with a cardiologist at [facility].  I discussed that she should keep this appointment and discuss her recent episodes of chest pain with the cardiologist.  Return precautions discussed.  All questions were answered.  She was discharged home in stable condition.    Diagnosis:    ICD-10-CM   1. Acute chest pain R07.9     Disposition:  discharged to home    Scribe Disclosure:  Kaelyn AGUILAR, am serving as a scribe on 2/20/2018 at 4:58  PM to personally document services performed by Flavia Krueger MD based on my observations and the provider's statements to me.     Kaelyn Schwartz  2/20/2018   [facility] EMERGENCY DEPARTMENT       Flavia Krueger MD  02/21/18 1056

## 2018-02-20 NOTE — ED AVS SNAPSHOT
[facility] Emergency Department    [address]    [facility] [address]    Phone:  [phone]    Fax:  [phone]                                       Amanda Cox   MRN: [id number]    Department:  [facility] Emergency Department   Date of Visit:  2/20/2018           After Visit Summary Signature Page     I have received my discharge instructions, and my questions have been answered. I have discussed any challenges I see with this plan with the nurse or doctor.    ..........................................................................................................................................  Patient/Patient Representative Signature      ..........................................................................................................................................  Patient Representative Print Name and Relationship to Patient    ..................................................               ................................................  Date                                            Time    ..........................................................................................................................................  Reviewed by Signature/Title    ...................................................              ..............................................  Date                                                            Time

## 2018-02-20 NOTE — ED NOTES
Pt states taking propranolol and that she was told to not take aspirin while on that medication since it can make it less effective. Unsure if she should take the prescribed aspirin. Explained to pt that I would check with MD.

## 2018-02-20 NOTE — ED NOTES
"Pt states chest pain that starts in middle of chest and goes down left arm and up left side of neck. Pt states she has been having these attacks where she will get intense pain for a couple hours and then it will go away and states it feels \"achy\" for the couple days after. Pt states having SOB since Saturday especially when laying down along with dizziness/lightheaded. Pt also states experiencing nausea for past 24 hours. Pt states having appointment with cardiologist tomorrow but states pain was getting worse so thought she should be seen.  "

## 2018-02-21 LAB — INTERPRETATION ECG - MUSE: NORMAL

## 2018-02-21 NOTE — ED NOTES
Pt provided with discharge paperwork and educated on recommended follow-up with PCP and cardiologist. Pt educated on how to manage symptoms at home. Pt voiced understanding and denied any questions at discharge. Pt ambulated to lobby with .

## 2018-07-12 ENCOUNTER — OFFICE VISIT (OUTPATIENT)
Dept: FAMILY MEDICINE | Facility: CLINIC | Age: 43
End: 2018-07-12

## 2018-07-12 VITALS
WEIGHT: 150.2 LBS | HEART RATE: 73 BPM | SYSTOLIC BLOOD PRESSURE: 114 MMHG | OXYGEN SATURATION: 99 % | DIASTOLIC BLOOD PRESSURE: 86 MMHG | BODY MASS INDEX: 25.02 KG/M2 | TEMPERATURE: 98.2 F | HEIGHT: 65 IN

## 2018-07-12 DIAGNOSIS — R25.2 CRAMP OF LIMB: ICD-10-CM

## 2018-07-12 DIAGNOSIS — Z71.89 ACP (ADVANCE CARE PLANNING): ICD-10-CM

## 2018-07-12 DIAGNOSIS — M54.50 ACUTE LEFT-SIDED LOW BACK PAIN WITHOUT SCIATICA: Primary | ICD-10-CM

## 2018-07-12 DIAGNOSIS — Z87.442 HISTORY OF KIDNEY STONES: ICD-10-CM

## 2018-07-12 DIAGNOSIS — Z76.89 HEALTH CARE HOME: ICD-10-CM

## 2018-07-12 LAB
ALBUMIN (URINE): ABNORMAL MG/DL
APPEARANCE UR: CLEAR
BACTERIA, UR: ABNORMAL
BILIRUB UR QL: ABNORMAL
CASTS/LPF: ABNORMAL
COLOR UR: YELLOW
EP/HPF: ABNORMAL
ERYTHROCYTE [DISTWIDTH] IN BLOOD BY AUTOMATED COUNT: 15.3 %
GLUCOSE URINE: ABNORMAL MG/DL
HCT VFR BLD AUTO: 35.7 % (ref 35–47)
HEMOGLOBIN: 11.6 G/DL (ref 11.7–15.7)
HGB UR QL: ABNORMAL
KETONES UR QL: 15 MG/DL
LEUKOCYTE ESTERASE - QUEST: ABNORMAL
MCH RBC QN AUTO: 26.7 PG (ref 26–33)
MCHC RBC AUTO-ENTMCNC: 32.5 G/DL (ref 31–36)
MCV RBC AUTO: 82.2 FL (ref 78–100)
MISC.: ABNORMAL
NITRITE UR QL STRIP: ABNORMAL
PH UR STRIP: 7 PH (ref 5–7)
PLATELET COUNT - QUEST: 173 10^9/L (ref 150–375)
RBC # BLD AUTO: 4.34 10*12/L (ref 3.8–5.2)
RBC, UR MICRO: ABNORMAL (ref ?–2)
SP. GRAVITY: 1.02
UROBILINOGEN UR QL STRIP: 0.2 EU/DL (ref 0.2–1)
WBC # BLD AUTO: 7 10*9/L (ref 4–11)
WBC, UR MICRO: ABNORMAL (ref ?–2)

## 2018-07-12 PROCEDURE — 87086 URINE CULTURE/COLONY COUNT: CPT | Mod: 90 | Performed by: PHYSICIAN ASSISTANT

## 2018-07-12 PROCEDURE — 99202 OFFICE O/P NEW SF 15 MIN: CPT | Performed by: PHYSICIAN ASSISTANT

## 2018-07-12 PROCEDURE — 87186 SC STD MICRODIL/AGAR DIL: CPT | Mod: 90 | Performed by: PHYSICIAN ASSISTANT

## 2018-07-12 PROCEDURE — 85027 COMPLETE CBC AUTOMATED: CPT | Performed by: PHYSICIAN ASSISTANT

## 2018-07-12 PROCEDURE — 87088 URINE BACTERIA CULTURE: CPT | Mod: 90 | Performed by: PHYSICIAN ASSISTANT

## 2018-07-12 PROCEDURE — 87077 CULTURE AEROBIC IDENTIFY: CPT | Mod: 90 | Performed by: PHYSICIAN ASSISTANT

## 2018-07-12 PROCEDURE — 81001 URINALYSIS AUTO W/SCOPE: CPT | Performed by: PHYSICIAN ASSISTANT

## 2018-07-12 PROCEDURE — 36415 COLL VENOUS BLD VENIPUNCTURE: CPT | Performed by: PHYSICIAN ASSISTANT

## 2018-07-12 PROCEDURE — 80053 COMPREHEN METABOLIC PANEL: CPT | Mod: 90 | Performed by: PHYSICIAN ASSISTANT

## 2018-07-12 RX ORDER — TAMSULOSIN HYDROCHLORIDE 0.4 MG/1
0.4 CAPSULE ORAL DAILY
Qty: 30 CAPSULE | Refills: 0 | Status: SHIPPED | OUTPATIENT
Start: 2018-07-12 | End: 2018-08-14

## 2018-07-12 RX ORDER — HYDROCODONE BITARTRATE AND ACETAMINOPHEN 5; 325 MG/1; MG/1
1 TABLET ORAL EVERY 4 HOURS PRN
Qty: 18 TABLET | Refills: 0 | Status: SHIPPED | OUTPATIENT
Start: 2018-07-12 | End: 2018-08-14

## 2018-07-12 RX ORDER — NITROGLYCERIN 0.4 MG/1
0.4 TABLET SUBLINGUAL
COMMUNITY
Start: 2018-02-21

## 2018-07-12 RX ORDER — ALBUTEROL SULFATE 90 UG/1
2 AEROSOL, METERED RESPIRATORY (INHALATION) EVERY 6 HOURS
COMMUNITY
End: 2018-08-14

## 2018-07-12 NOTE — PROGRESS NOTES
"CC: Kidney stone?    History:  Amanda is here with back ache that started 1 week ago. Mainly on left side of mid-back. This morning had blood in urine.     History of kidney stones. Known to have several in her kidneys confirmed with CT scan 1 year ago in [place].     Started keto diet 6 weeks ago. 2 week ago started to get leg cramps, feeling faint. Has been easing off keto diet.        PMH, MEDICATIONS, ALLERGIES, SOCIAL AND FAMILY HISTORY in [facility] and reviewed by me personally.      ROS negative other than the symptoms noted above in the HPI.        Examination   /86 (BP Location: Left arm, Patient Position: Chair, Cuff Size: Adult Large)  Pulse 73  Temp 98.2  F (36.8  C) (Oral)  Ht 1.651 m (5' 5\")  Wt 68.1 kg (150 lb 3.2 oz)  LMP 06/14/2018 (Within Weeks)  SpO2 99%  Breastfeeding? No  BMI 24.99 kg/m2       Constitutional: Sitting comfortably, in no acute distress. Vital signs noted  Cardiovascular:  regular rate and rhythm, no murmurs, clicks, or gallops  Respiratory:  normal respiratory rate and rhythm, lungs clear to auscultation  Abdomen: Abdomen soft, non-tender. BS normal. No masses, organomegaly  M/S: No CVA tenderness.   SKIN: No jaundice/pallor/rash.   Psychiatric: mentation appears normal and affect normal/bright        A/P    ICD-10-CM    1. Acute left-sided low back pain without sciatica M54.5 HCL  Urinalysis, Routine ([facility])     Comprehensive metabolic panel     RADIOLOGY REFERRAL     tamsulosin (FLOMAX) 0.4 MG capsule     HYDROcodone-acetaminophen (NORCO) 5-325 MG per tablet     HEMOGRAM/PLATELET ([facility])     CT Abdomen Pelvis w/o & w Contrast     Urine Culture Aerobic Bacterial   2. History of kidney stones Z87.442 HCL  Urinalysis, Routine ([facility])     RADIOLOGY REFERRAL     tamsulosin (FLOMAX) 0.4 MG capsule     HYDROcodone-acetaminophen (NORCO) 5-325 MG per tablet     CT Abdomen Pelvis w/o & w Contrast     Urine Culture Aerobic Bacterial     CANCELED: CT Abdomen Pelvis w/o & w Contrast   3. Cramp " of limb R25.2 VENOUS COLLECTION     Comprehensive metabolic panel   4. ACP (advance care planning) Z71.89    5. Health Care Home Z76.89        DISCUSSION:   UA shows trace blood, but no RBC on micro. 8 WBCs. Leuk esterase moderate. Will culture to rule out infection. CMP pending, and will not return until Monday. Given history of kidney stones, feel that follow-up CT would be indicated. She will call tomorrow to get this done tomorrow as imaging is currently closed. I will call her with results when available. In the meantime, did prescribe small quantity of Norco to help with pain.  Take with food. No driving while taking. Warned of side effects, addictive potential. Prescribed Flomax as well. Should likely have local nephrologist.     follow up visit: As needed    [facility]

## 2018-07-12 NOTE — NURSING NOTE
Amanda Cox is here today for left sided flank pain for the past week.    Pre-visit Screening:    Immunizations:  up to date (Per Patient)  Colonoscopy:  NA  Mammogram: is up to date  Asthma Action Test/Plan:  NA  PHQ9:  NA  GAD7:  NA    Questioned patient about current smoking habits Pt. has never smoked.    Is it ok to leave a detailed message on cell phone's voice mail for today's visit only? Yes     Telephone Information:   Mobile [phone]       Mel Pacheco CMA

## 2018-07-12 NOTE — MR AVS SNAPSHOT
After Visit Summary   7/12/2018    Amanda Cox    MRN: [id number]           Patient Information     Date Of Birth          1975        Visit Information        Provider Department      7/12/2018 6:00 PM Shirley, Kelley Jenny, PA-C [facility]        Today's Diagnoses     Acute left-sided low back pain without sciatica    -  1    History of kidney stones        Cramp of limb        ACP (advance care planning)        Health Care Home           Follow-ups after your visit        Additional Services     RADIOLOGY REFERRAL       Your provider has referred you to: N: [facility] [phone]   [email or web address]        Please be aware that coverage of these services is subject to the terms and limitations of your health insurance plan.  Call member services at your health plan with any benefit or coverage questions.      Please bring the following to your appointment:    >>   Any x-rays, CTs or MRIs which have been performed.  Contact the facility where they were done to arrange for  prior to your scheduled appointment.    >>   List of current medications   >>   This referral request   >>   Any documents/labs given to you for this referral    Prior authorization is required for MRI/MRA, CT, Dexa Scans and Worker's Compensation cases.                  Follow-up notes from your care team     Return if symptoms worsen or fail to improve.      Future tests that were ordered for you today     Open Future Orders        Priority Expected Expires Ordered    CT Abdomen Pelvis w/o & w Contrast Routine  7/12/2019 7/12/2018    RADIOLOGY REFERRAL Routine  7/12/2019 7/12/2018            Who to contact     If you have questions or need follow up information about today's clinic visit or your schedule please contact [facility] directly at [phone].  Normal or non-critical lab and imaging results will be communicated to you by Jose  "letter or phone within 4 business days after the clinic has received the results. If you do not hear from us within 7 days, please contact the clinic through LIN TV or phone. If you have a critical or abnormal lab result, we will notify you by phone as soon as possible.  Submit refill requests through LIN TV or call your pharmacy and they will forward the refill request to us. Please allow 3 business days for your refill to be completed.          Additional Information About Your Visit        LIN TV Information     LIN TV lets you send messages to your doctor, view your test results, renew your prescriptions, schedule appointments and more. To sign up, go to [email or web address] . Click on \"Log in\" on the left side of the screen, which will take you to the Welcome page. Then click on \"Sign up Now\" on the right side of the page.     You will be asked to enter the access code listed below, as well as some personal information. Please follow the directions to create your username and password.     Your access code is: [email or web address]  Expires: 10/10/2018  6:04 PM     Your access code will  in 90 days. If you need help or a new code, please call your [place] clinic or [phone].        Care EveryWhere ID     This is your Care EveryWhere ID. This could be used by other organizations to access your [place] medical records  [id number]        Your Vitals Were     Pulse Temperature Height Last Period Pulse Oximetry Breastfeeding?    73 98.2  F (36.8  C) (Oral) 1.651 m (5' 5\") 2018 (Within Weeks) 99% No    BMI (Body Mass Index)                   24.99 kg/m2            Blood Pressure from Last 3 Encounters:   18 114/86   18 128/90   18 (!) 137/97    Weight from Last 3 Encounters:   18 68.1 kg (150 lb 3.2 oz)   18 68 kg (150 lb)   18 66.7 kg (147 lb)              We Performed the Following     Comprehensive metabolic panel     HCL  Urinalysis, Routine (BFP)     " HEMOGRAM/PLATELET (BFP)     Urine Culture Aerobic Bacterial     VENOUS COLLECTION          Today's Medication Changes          These changes are accurate as of 7/12/18 11:59 PM.  If you have any questions, ask your nurse or doctor.               Start taking these medicines.        Dose/Directions    HYDROcodone-acetaminophen 5-325 MG per tablet   Commonly known as:  NORCO   Used for:  Acute left-sided low back pain without sciatica, History of kidney stones   Started by:  Kelley Shirley PA-C        Dose:  1 tablet   Take 1 tablet by mouth every 4 hours as needed for pain   Quantity:  18 tablet   Refills:  0       tamsulosin 0.4 MG capsule   Commonly known as:  FLOMAX   Used for:  History of kidney stones, Acute left-sided low back pain without sciatica   Started by:  Kelley Shirley PA-C        Dose:  0.4 mg   Take 1 capsule (0.4 mg) by mouth daily   Quantity:  30 capsule   Refills:  0            Where to get your medicines      These medications were sent to [facility] [place], [place] - [address] [facility] [address]  [facility] [address] [facility] [address]     Phone:  [phone]     tamsulosin 0.4 MG capsule         Some of these will need a paper prescription and others can be bought over the counter.  Ask your nurse if you have questions.     Bring a paper prescription for each of these medications     HYDROcodone-acetaminophen 5-325 MG per tablet               Information about OPIOIDS     PRESCRIPTION OPIOIDS: WHAT YOU NEED TO KNOW   We gave you an opioid (narcotic) pain medicine. It is important to manage your pain, but opioids are not always the best choice. You should first try all the other options your care team gave you. Take this medicine for as short a time (and as few doses) as possible.     These medicines have risks:    DO NOT drive when on new or higher doses of pain medicine. These medicines can affect your alertness and reaction times, and you could be arrested for driving under the  influence (DUI). If you need to use opioids long-term, talk to your care team about driving.    DO NOT operate heave machinery    DO NOT do any other dangerous activities while taking these medicines.     DO NOT drink any alcohol while taking these medicines.      If the opioid prescribed includes acetaminophen, DO NOT take with any other medicines that contain acetaminophen. Read all labels carefully. Look for the word  acetaminophen  or  Tylenol.  Ask your pharmacist if you have questions or are unsure.    You can get addicted to pain medicines, especially if you have a history of addiction (chemical, alcohol or substance dependence). Talk to your care team about ways to reduce this risk.    Store your pills in a secure place, locked if possible. We will not replace any lost or stolen medicine. If you don t finish your medicine, please throw away (dispose) as directed by your pharmacist. The [place] Pollution Control Agency has more information about safe disposal: https://www.pca.[facility].us/living-green/managing-unwanted-medications.     All opioids tend to cause constipation. Drink plenty of water and eat foods that have a lot of fiber, such as fruits, vegetables, prune juice, apple juice and high-fiber cereal. Take a laxative (Miralax, milk of magnesia, Colace, Senna) if you don t move your bowels at least every other day.          Primary Care Provider Office Phone # Fax #    Kelley Shirley PA-C [phone] [address] [address]        Equal Access to Services     WILFRID PEREZ : Hadii aad ku hadasho Sogerber, waaxda luqadaha, qaybta kaalmada adeegyada, cory horan. So [facility] [phone].    ATENCIÓN: Si habla español, tiene a mae disposición servicios gratuitos de asistencia lingüística. Llame al [phone].    We comply with applicable federal civil rights laws and [place] laws. We do not discriminate on the basis of  race, color, national origin, age, disability, sex, sexual orientation, or gender identity.            Thank you!     Thank you for choosing [facility]  for your care. Our goal is always to provide you with excellent care. Hearing back from our patients is one way we can continue to improve our services. Please take a few minutes to complete the written survey that you may receive in the mail after your visit with us. Thank you!             Your Updated Medication List - Protect others around you: Learn how to safely use, store and throw away your medicines at www.disposemymeds.org.          This list is accurate as of 7/12/18 11:59 PM.  Always use your most recent med list.                   Brand Name Dispense Instructions for use Diagnosis    albuterol 108 (90 Base) MCG/ACT Inhaler    PROAIR HFA/PROVENTIL HFA/VENTOLIN HFA     Inhale 2 puffs into the lungs every 6 hours        Calcium-Magnesium-Vitamin D 300- MG-MG-UNIT Chew           GLUCOSAMINE SULFATE PO           HYDROcodone-acetaminophen 5-325 MG per tablet    NORCO    18 tablet    Take 1 tablet by mouth every 4 hours as needed for pain    Acute left-sided low back pain without sciatica, History of kidney stones       IBUPROFEN PO           nitroGLYcerin 0.4 MG sublingual tablet    NITROSTAT     Place 0.4 mg under the tongue        propranolol 80 MG 24 hr capsule    INDERAL LA     Take 80 mg by mouth        tamsulosin 0.4 MG capsule    FLOMAX    30 capsule    Take 1 capsule (0.4 mg) by mouth daily    History of kidney stones, Acute left-sided low back pain without sciatica       TYLENOL PO

## 2018-07-13 ENCOUNTER — TELEPHONE (OUTPATIENT)
Dept: FAMILY MEDICINE | Facility: CLINIC | Age: 43
End: 2018-07-13

## 2018-07-13 ENCOUNTER — HEALTH MAINTENANCE LETTER (OUTPATIENT)
Age: 43
End: 2018-07-13

## 2018-07-13 DIAGNOSIS — N85.2 LARGE UTERUS: ICD-10-CM

## 2018-07-13 DIAGNOSIS — N95.1 MENOPAUSAL SYNDROME (HOT FLASHES): Primary | ICD-10-CM

## 2018-07-13 NOTE — TELEPHONE ENCOUNTER
----- Message from Kelley Shirley PA-C sent at 7/13/2018 12:56 AM CDT -----  Regarding: Imaging Fax  Whoever is opening Friday, please fax imaging [facility] if [facility] how. Patient will be calling to schedule, which is why I would appreciate the fax getting over there first thing. Thank you.

## 2018-07-13 NOTE — TELEPHONE ENCOUNTER
[facility] called and said they can't use contrast if they are looking for kidney stones.   They asked if we could fax a new order without contrast.  Faxed order, will confirm with [facility] that this is what she would like the patient to get done.

## 2018-07-13 NOTE — TELEPHONE ENCOUNTER
"Called Amanda to inform of CT results. Shows kidney stones in kidney, but nothing that correlated to her pain.    Also mentions in 3 weeks is having episodes at night. Has happened 10 days out of 3 weeks, with 2 hours of feeling very hot, then nausea/racing heart, and then can \"feel every hair on her head\".     Does have a hole in her heart that was confirmed by bubble study.     Advised her see OBGYN. Gave info for [facility], and she will call. Will write referral.    Will contact me next week with update if not significantly better.  "

## 2018-07-14 LAB
ALBUMIN SERPL-MCNC: 4.2 G/DL (ref 3.6–5.1)
ALBUMIN/GLOB SERPL: 1.5 (CALC) (ref 1–2.5)
ALP SERPL-CCNC: 52 U/L (ref 33–115)
ALT SERPL-CCNC: 10 U/L (ref 6–29)
AST SERPL-CCNC: 10 U/L (ref 10–30)
BILIRUB SERPL-MCNC: 1.2 MG/DL (ref 0.2–1.2)
BUN SERPL-MCNC: 14 MG/DL (ref 7–25)
BUN/CREATININE RATIO: NORMAL (CALC) (ref 6–22)
CALCIUM SERPL-MCNC: 9.2 MG/DL (ref 8.6–10.2)
CHLORIDE SERPLBLD-SCNC: 106 MMOL/L (ref 98–110)
CO2 SERPL-SCNC: 22 MMOL/L (ref 20–31)
CREAT SERPL-MCNC: 0.69 MG/DL (ref 0.5–1.1)
EGFR AFRICAN AMERICAN - QUEST: 125 ML/MIN/1.73M2
GFR SERPL CREATININE-BSD FRML MDRD: 108 ML/MIN/1.73M2
GLOBULIN, CALCULATED - QUEST: 2.8 G/DL (CALC) (ref 1.9–3.7)
GLUCOSE - QUEST: 95 MG/DL (ref 65–99)
POTASSIUM SERPL-SCNC: 4.6 MMOL/L (ref 3.5–5.3)
PROT SERPL-MCNC: 7 G/DL (ref 6.1–8.1)
SODIUM SERPL-SCNC: 137 MMOL/L (ref 135–146)

## 2018-07-16 LAB — URINE VOIDED CULTURE: ABNORMAL

## 2018-07-17 ENCOUNTER — TELEPHONE (OUTPATIENT)
Dept: FAMILY MEDICINE | Facility: CLINIC | Age: 43
End: 2018-07-17

## 2018-07-17 DIAGNOSIS — N30.91 CYSTITIS WITH HEMATURIA: Primary | ICD-10-CM

## 2018-07-17 RX ORDER — SULFAMETHOXAZOLE/TRIMETHOPRIM 800-160 MG
1 TABLET ORAL 2 TIMES DAILY
Qty: 14 TABLET | Refills: 0 | Status: SHIPPED | OUTPATIENT
Start: 2018-07-17 | End: 2018-07-23

## 2018-07-17 NOTE — TELEPHONE ENCOUNTER
Called and talked to Amanda. Symptoms are stable. Informed of positive urine culture. Will send Bactrim to pharmacy for 7 days. Take with food. Warned of side effects. Contact me with concerns.

## 2018-07-23 ENCOUNTER — MYC MEDICAL ADVICE (OUTPATIENT)
Dept: FAMILY MEDICINE | Facility: CLINIC | Age: 43
End: 2018-07-23

## 2018-07-23 DIAGNOSIS — N30.91 CYSTITIS WITH HEMATURIA: ICD-10-CM

## 2018-07-23 RX ORDER — SULFAMETHOXAZOLE/TRIMETHOPRIM 800-160 MG
1 TABLET ORAL 2 TIMES DAILY
Qty: 14 TABLET | Refills: 0 | Status: SHIPPED | OUTPATIENT
Start: 2018-07-23 | End: 2018-08-14

## 2018-07-23 NOTE — TELEPHONE ENCOUNTER
From: Amanda Cox  To: Kelley Shirley PA-C  Sent: 7/23/2018 4:40 PM CDT  Subject: A follow-up question for a visit within the past seven days    Dr. Shirley,  I am taking my last antibiotics today and am still have lower back pain and some pain in my abdomen. It's worse when I get dehydrated. I am working hard to stay hydrated but at night it's hard.   Should I do another course of antibiotics?   Over all symptoms are better.   Thanks  Amanda

## 2018-07-27 ENCOUNTER — MYC MEDICAL ADVICE (OUTPATIENT)
Dept: FAMILY MEDICINE | Facility: CLINIC | Age: 43
End: 2018-07-27

## 2018-07-27 DIAGNOSIS — N20.0 CALCULUS OF KIDNEY: Primary | ICD-10-CM

## 2018-07-27 NOTE — TELEPHONE ENCOUNTER
Kelley patients , Da sent a MY CHART message under his name RE patient - Amanda.     Good morning Dr. Ann. My wife Amanda was in your office about 10 days ago and saw a PAC regarding possible kidney stones. Her symptoms have not resolved and we would like to see a Nephrologist to try and resolve this. Can you provide a recommendation?     Thanks,     Da Benson please advise - Thank you

## 2018-08-02 ENCOUNTER — TRANSFERRED RECORDS (OUTPATIENT)
Dept: FAMILY MEDICINE | Facility: CLINIC | Age: 43
End: 2018-08-02

## 2018-08-14 ENCOUNTER — OFFICE VISIT (OUTPATIENT)
Dept: FAMILY MEDICINE | Facility: CLINIC | Age: 43
End: 2018-08-14

## 2018-08-14 VITALS
SYSTOLIC BLOOD PRESSURE: 114 MMHG | BODY MASS INDEX: 23.88 KG/M2 | HEIGHT: 66 IN | DIASTOLIC BLOOD PRESSURE: 72 MMHG | WEIGHT: 148.6 LBS | TEMPERATURE: 97.8 F | HEART RATE: 60 BPM | OXYGEN SATURATION: 99 %

## 2018-08-14 DIAGNOSIS — Z86.2 HISTORY OF ANEMIA: ICD-10-CM

## 2018-08-14 DIAGNOSIS — N20.0 CALCULUS OF KIDNEY: ICD-10-CM

## 2018-08-14 DIAGNOSIS — J30.1 NON-SEASONAL ALLERGIC RHINITIS DUE TO POLLEN, UNSPECIFIED CHRONICITY: ICD-10-CM

## 2018-08-14 DIAGNOSIS — Q25.0 PATENT DUCTUS ARTERIOSUS: ICD-10-CM

## 2018-08-14 DIAGNOSIS — K27.9 PUD (PEPTIC ULCER DISEASE): ICD-10-CM

## 2018-08-14 DIAGNOSIS — I10 BENIGN ESSENTIAL HYPERTENSION: ICD-10-CM

## 2018-08-14 DIAGNOSIS — G43.009 MIGRAINE WITHOUT AURA AND WITHOUT STATUS MIGRAINOSUS, NOT INTRACTABLE: ICD-10-CM

## 2018-08-14 DIAGNOSIS — N12 PYELONEPHRITIS: Primary | ICD-10-CM

## 2018-08-14 LAB
% GRANULOCYTES: 62.3 %
ALBUMIN (URINE): ABNORMAL MG/DL
APPEARANCE UR: CLEAR
BACTERIA, UR: ABNORMAL
BILIRUB UR QL: ABNORMAL
CASTS/LPF: ABNORMAL
COLOR UR: YELLOW
EP/HPF: ABNORMAL
GLUCOSE URINE: ABNORMAL MG/DL
HCT VFR BLD AUTO: 35.3 % (ref 35–47)
HEMOGLOBIN: 11.4 G/DL (ref 11.7–15.7)
HGB UR QL: ABNORMAL
KETONES UR QL: ABNORMAL MG/DL
LEUKOCYTE ESTERASE - QUEST: ABNORMAL
LYMPHOCYTES NFR BLD AUTO: 30.1 %
MCH RBC QN AUTO: 26.6 PG (ref 26–33)
MCHC RBC AUTO-ENTMCNC: 32.3 G/DL (ref 31–36)
MCV RBC AUTO: 82.4 FL (ref 78–100)
MISC.: ABNORMAL
MONOCYTES NFR BLD AUTO: 7.6 %
NITRITE UR QL STRIP: ABNORMAL
PH UR STRIP: 7 PH (ref 5–7)
PLATELET COUNT - QUEST: 15.8 10^9/L (ref 150–375)
RBC # BLD AUTO: 4.29 10*12/L (ref 3.8–5.2)
RBC, UR MICRO: ABNORMAL (ref ?–2)
SP. GRAVITY: <=1.005
UROBILINOGEN UR QL STRIP: 0.2 EU/DL (ref 0.2–1)
WBC # BLD AUTO: 6.2 10*9/L (ref 4–11)
WBC, UR MICRO: ABNORMAL (ref ?–2)

## 2018-08-14 PROCEDURE — 82306 VITAMIN D 25 HYDROXY: CPT | Mod: 90 | Performed by: FAMILY MEDICINE

## 2018-08-14 PROCEDURE — 80061 LIPID PANEL: CPT | Mod: 90 | Performed by: FAMILY MEDICINE

## 2018-08-14 PROCEDURE — 36415 COLL VENOUS BLD VENIPUNCTURE: CPT | Performed by: FAMILY MEDICINE

## 2018-08-14 PROCEDURE — 81001 URINALYSIS AUTO W/SCOPE: CPT | Performed by: FAMILY MEDICINE

## 2018-08-14 PROCEDURE — 99214 OFFICE O/P EST MOD 30 MIN: CPT | Performed by: FAMILY MEDICINE

## 2018-08-14 PROCEDURE — 85025 COMPLETE CBC W/AUTO DIFF WBC: CPT | Performed by: FAMILY MEDICINE

## 2018-08-14 PROCEDURE — 80053 COMPREHEN METABOLIC PANEL: CPT | Mod: 90 | Performed by: FAMILY MEDICINE

## 2018-08-14 RX ORDER — ALBUTEROL SULFATE 90 UG/1
2 AEROSOL, METERED RESPIRATORY (INHALATION) EVERY 6 HOURS
Qty: 3 INHALER | Refills: 1 | Status: SHIPPED | OUTPATIENT
Start: 2018-08-14

## 2018-08-14 NOTE — PROGRESS NOTES
SUBJECTIVE:  Amanda Cox, a 43 year old female scheduled an appointment to discuss the following issues:     Pyelonephritis  Calculus of kidney  Patent ductus arteriosus  Benign essential hypertension  Migraine without aura and without status migrainosus, not intractable  History of anemia  Pt here to establish care     She had recent bout with pyelo and kidney stones-is seeing urology and nephrology for further testing    Pt takes propraolol started initially for HTN but is made her migraines resolve so she has continued     PT relates hx of both B12 deficiency anemia dn iron deficiency in the past-not currently on supplements    Medical, social, surgical, and family histories reviewed.    Patient Active Problem List   Diagnosis     ACP (advance care planning)     Health Care Home     Calculus of kidney     Patent ductus arteriosus     Migraine without aura and without status migrainosus, not intractable     Benign essential hypertension     PUD (peptic ulcer disease)     Past Medical History:   Diagnosis Date     Hypertension      Ovarian cyst      PFO (patent foramen ovale)     Diagnosed with bubble study     TIA (transient ischemic attack) Age 33     Family History   Problem Relation Age of Onset     Coronary Artery Disease Father      Diabetes Father      Coronary Artery Disease Paternal Grandmother      Social History     Social History     Marital status:      Spouse name: N/A     Number of children: 2     Years of education: N/A     Occupational History     stay at home mom      Social History Main Topics     Smoking status: Never Smoker     Smokeless tobacco: Never Used     Alcohol use Yes      Comment: 5 per week     Drug use: No     Sexual activity: Yes     Partners: Male     Other Topics Concern     Not on file     Social History Narrative     Past Surgical History:   Procedure Laterality Date     GYN SURGERY  2017    right ovary removed, endometriosis, fallopian tubes removed       Current  "Outpatient Prescriptions on File Prior to Visit:  Acetaminophen (TYLENOL PO)    Calcium-Magnesium-Vitamin D 300- MG-MG-UNIT CHEW    IBUPROFEN PO    nitroGLYcerin (NITROSTAT) 0.4 MG sublingual tablet Place 0.4 mg under the tongue   propranolol (INDERAL LA) 80 MG 24 hr capsule Take 80 mg by mouth   [DISCONTINUED] albuterol (PROAIR HFA/PROVENTIL HFA/VENTOLIN HFA) 108 (90 Base) MCG/ACT Inhaler Inhale 2 puffs into the lungs every 6 hours     No current facility-administered medications on file prior to visit.      Allergies: Codeine      There is no immunization history on file for this patient.     ROS:  CONSTITUTIONAL: NEGATIVE for fever, chills  EYES: NEGATIVE for vision changes   RESP: NEGATIVE for significant cough or SOB  CV: NEGATIVE for chest pain, palpitations   GI: NEGATIVE for nausea, abdominal pain, heartburn, or change in bowel habits  : NEGATIVE for frequency, dysuria, or hematuria  MUSCULOSKELETAL: NEGATIVE for significant arthralgias or myalgia  NEURO: NEGATIVE for weakness, dizziness or paresthesias or headache  ENDOCRINE: NEGATIVE for temperature intolerance, skin/hair changes  PSYCHIATRIC: NEGATIVE for changes in mood or affect    OBJECTIVE:  /72 (BP Location: Right arm, Patient Position: Sitting, Cuff Size: Adult Large)  Pulse 60  Temp 97.8  F (36.6  C) (Oral)  Ht 1.664 m (5' 5.5\")  Wt 67.4 kg (148 lb 9.6 oz)  LMP 08/06/2018  SpO2 99%  BMI 24.35 kg/m2  EXAM:  GENERAL APPEARANCE: healthy, alert and no distress  EYES: EOMI,  PERRL  HENT: ear canals and TM's normal and nose and mouth without ulcers or lesions  RESP: lungs clear to auscultation - no rales, rhonchi or wheezes  CV: regular rates and rhythm, normal S1 S2, no S3 or S4 and no murmur, click or rub -  ABDOMEN:  soft, nontender, no HSM or masses and bowel sounds normal  SKIN: no suspicious lesions or rashes  PSYCH: mentation appears normal and affect normal/bright    ASSESSMENT/PLAN:  (N12) Pyelonephritis  (primary encounter " diagnosis)  Comment: resolving clinically  Plan: HCL URINALYSIS, ROUTINE        Continue f/u    (N20.0) Calculus of kidney  Comment: seeing urology  Plan:     (Q25.0) Patent ductus arteriosus  Comment: testing has revealed no need for further interventions  Plan:     (I10) Benign essential hypertension  Comment: well controlled  Plan: Lipid Profile ([facility]), COMPREHENSIVE METABOLIC         PANEL ([facility]) XCMP, VENOUS COLLECTION        continue current medications at current doses     (G43.009) Migraine without aura and without status migrainosus, not intractable  Comment: well controlled with beta blocker  Plan:     (Z86.2) History of anemia  Comment: recheck today  Plan: CL AFF HEMOGRAM/PLATE/DIFF (BFP), VENOUS         COLLECTION, Vitamin D deficiency screening         ([facility])

## 2018-08-14 NOTE — NURSING NOTE
Amanda is here to establish care get blood work done and recheck her urine     Pre-visit Screening:  Immunizations:  up to date  Colonoscopy:  NA  Mammogram: is up to date  Asthma Action Test/Plan:  Environmental induced only  PHQ9:  NA  GAD7:  NA  Questioned patient about current smoking habits Pt. has never smoked.  Ok to leave detailed message on voice mail for today's visit only Yes, phone # [phone]

## 2018-08-14 NOTE — MR AVS SNAPSHOT
After Visit Summary   8/14/2018    Amanda Cox    MRN: [id number]           Patient Information     Date Of Birth          1975        Visit Information        Provider Department      8/14/2018 12:00 PM Dennis Ann MD [facility]        Today's Diagnoses     Pyelonephritis    -  1    Calculus of kidney        Patent ductus arteriosus        Benign essential hypertension        Migraine without aura and without status migrainosus, not intractable        History of anemia        PUD (peptic ulcer disease)        Non-seasonal allergic rhinitis due to pollen, unspecified chronicity           Follow-ups after your visit        Who to contact     If you have questions or need follow up information about today's clinic visit or your schedule please contact [facility] directly at [phone].  Normal or non-critical lab and imaging results will be communicated to you by MyChart, letter or phone within 4 business days after the clinic has received the results. If you do not hear from us within 7 days, please contact the clinic through MyChart or phone. If you have a critical or abnormal lab result, we will notify you by phone as soon as possible.  Submit refill requests through [email or web address] or call your pharmacy and they will forward the refill request to us. Please allow 3 business days for your refill to be completed.          Additional Information About Your Visit        MyChart Information     [email or web address] gives you secure access to your electronic health record. If you see a primary care provider, you can also send messages to your care team and make appointments. If you have questions, please call your primary care clinic.  If you do not have a primary care provider, please call [phone] and they will assist you.        Care EveryWhere ID     This is your Care EveryWhere ID. This could be used by other organizations to access your [place]  "medical records  [id number]        Your Vitals Were     Pulse Temperature Height Last Period Pulse Oximetry BMI (Body Mass Index)    60 97.8  F (36.6  C) (Oral) 1.664 m (5' 5.5\") 08/06/2018 99% 24.35 kg/m2       Blood Pressure from Last 3 Encounters:   08/14/18 114/72   07/12/18 114/86   02/20/18 128/90    Weight from Last 3 Encounters:   08/14/18 67.4 kg (148 lb 9.6 oz)   07/12/18 68.1 kg (150 lb 3.2 oz)   02/20/18 68 kg (150 lb)              We Performed the Following     CL AFF HEMOGRAM/PLATE/DIFF (BFP)     COMPREHENSIVE METABOLIC PANEL ([facility]) XCMP     HCL URINALYSIS, ROUTINE     Lipid Profile ([facility])     VENOUS COLLECTION     Vitamin D deficiency screening ([facility])          Where to get your medicines      These medications were sent to [facility] [address] [facility] [address] [facility] [address]  [address] [facility] [address], [place] [address]     Phone:  [phone]     albuterol 108 (90 Base) MCG/ACT inhaler          Primary Care Provider Office Phone # Fax #    Kelley Shirley PA-C [phone] [address] [facility] [address]  [facility] [address]        Equal Access to Services     CLARISSE PEREZ : Hadii leigha mcfarlane hadasho Soomaali, waaxda luqadaha, qaybta kaalmada adeegyada, cory horan. So [facility] [phone].    ATENCIÓN: Si habla español, tiene a mae disposición servicios gratuitos de asistencia lingüística. John al [phone].    We comply with applicable federal civil rights laws and [place] laws. We do not discriminate on the basis of race, color, national origin, age, disability, sex, sexual orientation, or gender identity.            Thank you!     Thank you for choosing [facility]  for your care. Our goal is always to provide you with excellent care. Hearing back from our patients is one way we can continue to improve our services. Please take a few minutes to complete the written survey that you may receive in the mail after your visit " with us. Thank you!             Your Updated Medication List - Protect others around you: Learn how to safely use, store and throw away your medicines at www.disposemymeds.org.          This list is accurate as of 8/14/18 12:54 PM.  Always use your most recent med list.                   Brand Name Dispense Instructions for use Diagnosis    albuterol 108 (90 Base) MCG/ACT inhaler    PROAIR HFA/PROVENTIL HFA/VENTOLIN HFA    3 Inhaler    Inhale 2 puffs into the lungs every 6 hours    Non-seasonal allergic rhinitis due to pollen, unspecified chronicity       Calcium-Magnesium-Vitamin D 300- MG-MG-UNIT Chew           IBUPROFEN PO           nitroGLYcerin 0.4 MG sublingual tablet    NITROSTAT     Place 0.4 mg under the tongue        propranolol 80 MG 24 hr capsule    INDERAL LA     Take 80 mg by mouth        TYLENOL PO

## 2018-08-15 ENCOUNTER — TELEPHONE (OUTPATIENT)
Dept: FAMILY MEDICINE | Facility: CLINIC | Age: 43
End: 2018-08-15

## 2018-08-15 LAB
ALBUMIN SERPL-MCNC: 4.2 G/DL (ref 3.6–5.1)
ALBUMIN/GLOB SERPL: 1.6 (CALC) (ref 1–2.5)
ALP SERPL-CCNC: 48 U/L (ref 33–115)
ALT SERPL-CCNC: 13 U/L (ref 6–29)
AST SERPL-CCNC: 14 U/L (ref 10–30)
BILIRUB SERPL-MCNC: 0.8 MG/DL (ref 0.2–1.2)
BUN SERPL-MCNC: 16 MG/DL (ref 7–25)
BUN/CREATININE RATIO: NORMAL (CALC) (ref 6–22)
CALCIUM SERPL-MCNC: 9.2 MG/DL (ref 8.6–10.2)
CHLORIDE SERPLBLD-SCNC: 104 MMOL/L (ref 98–110)
CHOLEST SERPL-MCNC: 128 MG/DL
CHOLEST/HDLC SERPL: 2.2 (CALC)
CO2 SERPL-SCNC: 25 MMOL/L (ref 20–32)
CREAT SERPL-MCNC: 0.61 MG/DL (ref 0.5–1.1)
EGFR AFRICAN AMERICAN - QUEST: 131 ML/MIN/1.73M2
GFR SERPL CREATININE-BSD FRML MDRD: 113 ML/MIN/1.73M2
GLOBULIN, CALCULATED - QUEST: 2.7 G/DL (CALC) (ref 1.9–3.7)
GLUCOSE - QUEST: 85 MG/DL (ref 65–99)
HDLC SERPL-MCNC: 59 MG/DL
LDLC SERPL CALC-MCNC: 53 MG/DL (CALC)
NONHDLC SERPL-MCNC: 69 MG/DL (CALC)
POTASSIUM SERPL-SCNC: 4.1 MMOL/L (ref 3.5–5.3)
PROT SERPL-MCNC: 6.9 G/DL (ref 6.1–8.1)
SODIUM SERPL-SCNC: 139 MMOL/L (ref 135–146)
TRIGL SERPL-MCNC: 80 MG/DL
VITAMIN D, 25-OH, TOTAL - QUEST: 61 NG/ML (ref 30–100)

## 2018-08-15 NOTE — TELEPHONE ENCOUNTER
Medical records request to [facility] . Request faxed/scan 8/15/18. Waiting on records

## 2018-08-22 ENCOUNTER — TRANSFERRED RECORDS (OUTPATIENT)
Dept: FAMILY MEDICINE | Facility: CLINIC | Age: 43
End: 2018-08-22

## 2018-08-27 ENCOUNTER — OFFICE VISIT (OUTPATIENT)
Dept: FAMILY MEDICINE | Facility: CLINIC | Age: 43
End: 2018-08-27

## 2018-08-27 VITALS
HEIGHT: 66 IN | TEMPERATURE: 97.9 F | BODY MASS INDEX: 23.78 KG/M2 | WEIGHT: 148 LBS | SYSTOLIC BLOOD PRESSURE: 118 MMHG | DIASTOLIC BLOOD PRESSURE: 74 MMHG | RESPIRATION RATE: 20 BRPM | HEART RATE: 68 BPM

## 2018-08-27 DIAGNOSIS — R10.9 LEFT FLANK PAIN: ICD-10-CM

## 2018-08-27 DIAGNOSIS — Z87.448 HX OF PYELONEPHRITIS: ICD-10-CM

## 2018-08-27 DIAGNOSIS — N20.0 CALCULUS OF KIDNEY: Primary | ICD-10-CM

## 2018-08-27 LAB
% GRANULOCYTES: 58 %
ALBUMIN (URINE): ABNORMAL MG/DL
APPEARANCE UR: ABNORMAL
BACTERIA, UR: ABNORMAL
BILIRUB UR QL: ABNORMAL
CASTS/LPF: ABNORMAL
COLOR UR: YELLOW
EP/HPF: ABNORMAL
ERYTHROCYTE [SEDIMENTATION RATE] IN BLOOD: 4 MM/HR (ref 0–20)
GLUCOSE URINE: ABNORMAL MG/DL
HCT VFR BLD AUTO: 37.6 % (ref 35–47)
HEMOGLOBIN: 12 G/DL (ref 11.7–15.7)
HGB UR QL: ABNORMAL
KETONES UR QL: ABNORMAL MG/DL
LEUKOCYTE ESTERASE - QUEST: ABNORMAL
LYMPHOCYTES NFR BLD AUTO: 32.9 %
MCH RBC QN AUTO: 26.8 PG (ref 26–33)
MCHC RBC AUTO-ENTMCNC: 31.9 G/DL (ref 31–36)
MCV RBC AUTO: 83.9 FL (ref 78–100)
MISC.: ABNORMAL
MONOCYTES NFR BLD AUTO: 9.1 %
NITRITE UR QL STRIP: ABNORMAL
PH UR STRIP: 5.5 PH (ref 5–7)
PLATELET COUNT - QUEST: 265 10^9/L (ref 150–375)
RBC # BLD AUTO: 4.48 10*12/L (ref 3.8–5.2)
RBC, UR MICRO: ABNORMAL (ref ?–2)
SP. GRAVITY: 1.01
UROBILINOGEN UR QL STRIP: 0.2 EU/DL (ref 0.2–1)
WBC # BLD AUTO: 7.5 10*9/L (ref 4–11)
WBC, UR MICRO: ABNORMAL (ref ?–2)

## 2018-08-27 PROCEDURE — 81001 URINALYSIS AUTO W/SCOPE: CPT | Performed by: FAMILY MEDICINE

## 2018-08-27 PROCEDURE — 85025 COMPLETE CBC W/AUTO DIFF WBC: CPT | Performed by: FAMILY MEDICINE

## 2018-08-27 PROCEDURE — 87086 URINE CULTURE/COLONY COUNT: CPT | Mod: 90 | Performed by: FAMILY MEDICINE

## 2018-08-27 PROCEDURE — 99213 OFFICE O/P EST LOW 20 MIN: CPT | Performed by: FAMILY MEDICINE

## 2018-08-27 PROCEDURE — 87077 CULTURE AEROBIC IDENTIFY: CPT | Mod: 90 | Performed by: FAMILY MEDICINE

## 2018-08-27 PROCEDURE — 85651 RBC SED RATE NONAUTOMATED: CPT | Performed by: FAMILY MEDICINE

## 2018-08-27 PROCEDURE — 36415 COLL VENOUS BLD VENIPUNCTURE: CPT | Performed by: FAMILY MEDICINE

## 2018-08-27 PROCEDURE — 87088 URINE BACTERIA CULTURE: CPT | Mod: 90 | Performed by: FAMILY MEDICINE

## 2018-08-27 PROCEDURE — 80053 COMPREHEN METABOLIC PANEL: CPT | Mod: 90 | Performed by: FAMILY MEDICINE

## 2018-08-27 PROCEDURE — 87186 SC STD MICRODIL/AGAR DIL: CPT | Mod: 90 | Performed by: FAMILY MEDICINE

## 2018-08-27 NOTE — MR AVS SNAPSHOT
"              After Visit Summary   8/27/2018    Amanda Cox    MRN: [id number]           Patient Information     Date Of Birth          1975        Visit Information        Provider Department      8/27/2018 3:45 PM Dennis Ann MD [facility]        Today's Diagnoses     Calculus of kidney    -  1    Hx of pyelonephritis        Left flank pain           Follow-ups after your visit        Who to contact     If you have questions or need follow up information about today's clinic visit or your schedule please contact [facility] directly at [phone].  Normal or non-critical lab and imaging results will be communicated to you by SurveyMonkeyhart, letter or phone within 4 business days after the clinic has received the results. If you do not hear from us within 7 days, please contact the clinic through SurveyMonkeyhart or phone. If you have a critical or abnormal lab result, we will notify you by phone as soon as possible.  Submit refill requests through Thoughtful Movers or call your pharmacy and they will forward the refill request to us. Please allow 3 business days for your refill to be completed.          Additional Information About Your Visit        MyChart Information     Thoughtful Movers gives you secure access to your electronic health record. If you see a primary care provider, you can also send messages to your care team and make appointments. If you have questions, please call your primary care clinic.  If you do not have a primary care provider, please call [phone] and they will assist you.        Care EveryWhere ID     This is your Care EveryWhere ID. This could be used by other organizations to access your [place] medical records  [id number]        Your Vitals Were     Pulse Temperature Respirations Height Last Period Breastfeeding?    68 97.9  F (36.6  C) (Oral) 20 1.664 m (5' 5.5\") 08/06/2018 No    BMI (Body Mass Index)                   24.25 kg/m2         "    Blood Pressure from Last 3 Encounters:   08/27/18 118/74   08/14/18 114/72   07/12/18 114/86    Weight from Last 3 Encounters:   08/27/18 67.1 kg (148 lb)   08/14/18 67.4 kg (148 lb 9.6 oz)   07/12/18 68.1 kg (150 lb 3.2 oz)              We Performed the Following     CL AFF HEMOGRAM/PLATE/DIFF (BFP)     COMPREHENSIVE METABOLIC PANEL ([facility]) XCMP     ESR, WESTERGREN (BFP)     HCL  Urinalysis, Routine (BFP)     Urine Culture Aerobic Bacterial     VENOUS COLLECTION        Primary Care Provider Office Phone # Fax #    Kelley Jenny Shirley PA-C [phone] [address] [address]        Equal Access to Services     CLARISSE PEREZ : Hadii leigha mcfarlane hadasho Soomaali, waaxda luqadaha, qaybta kaalmada adeegyada, cory theodore . So [facility] [phone].    ATENCIÓN: Si habla español, tiene a mae disposición servicios gratuitos de asistencia lingüística. Llame al [phone].    We comply with applicable federal civil rights laws and [place] laws. We do not discriminate on the basis of race, color, national origin, age, disability, sex, sexual orientation, or gender identity.            Thank you!     Thank you for choosing [facility]  for your care. Our goal is always to provide you with excellent care. Hearing back from our patients is one way we can continue to improve our services. Please take a few minutes to complete the written survey that you may receive in the mail after your visit with us. Thank you!             Your Updated Medication List - Protect others around you: Learn how to safely use, store and throw away your medicines at www.disposemymeds.org.          This list is accurate as of 8/27/18  4:35 PM.  Always use your most recent med list.                   Brand Name Dispense Instructions for use Diagnosis    albuterol 108 (90 Base) MCG/ACT inhaler    PROAIR HFA/PROVENTIL HFA/VENTOLIN HFA    3 Inhaler    Inhale 2  puffs into the lungs every 6 hours    Non-seasonal allergic rhinitis due to pollen, unspecified chronicity       Calcium-Magnesium-Vitamin D 300- MG-MG-UNIT Chew           IBUPROFEN PO           nitroGLYcerin 0.4 MG sublingual tablet    NITROSTAT     Place 0.4 mg under the tongue        propranolol 80 MG 24 hr capsule    INDERAL LA     Take 80 mg by mouth        TYLENOL PO

## 2018-08-27 NOTE — PROGRESS NOTES
"SUBJECTIVE:  Amanda Cox, a 43 year old female scheduled an appointment to discuss the following issues:     Calculus of kidney  Hx of pyelonephritis  Left flank pain  Pt was diagnosed with pyelo and renal stones (nonobstructing at time of CT) last month-has seen Dr Greenwood or urology and Dr Celestin of nephrology.  Pt will be undergoing a metabolic w/u for stones when settled down through Dr Celestin.    Pt states she is still having intermittent \"kidney pain\" on then left-now at 7 weeks.  She relates about 2 episodes per week where she will develop left flank aching, low grade fevers in  range, chills, nausea, and headaches.  It will last 12-24 hours and resolve.  She states she was having these issues when she saw Dr Greenwood and was told it will resolve in 4-6 weeks. No positional back symptoms . No rash    Medical, social, surgical, and family histories reviewed.    Patient Active Problem List   Diagnosis     ACP (advance care planning)     Health Care Home     Calculus of kidney     Patent ductus arteriosus     Migraine without aura and without status migrainosus, not intractable     Benign essential hypertension     PUD (peptic ulcer disease)     Past Medical History:   Diagnosis Date     Hypertension      Ovarian cyst      PFO (patent foramen ovale)     Diagnosed with bubble study     TIA (transient ischemic attack) Age 33     Family History   Problem Relation Age of Onset     Coronary Artery Disease Father      Diabetes Father      Coronary Artery Disease Paternal Grandmother      Social History     Social History     Marital status:      Spouse name: N/A     Number of children: 2     Years of education: N/A     Occupational History     stay at home mom      Social History Main Topics     Smoking status: Never Smoker     Smokeless tobacco: Never Used     Alcohol use Yes      Comment: 5 per week     Drug use: No     Sexual activity: Yes     Partners: Male     Other Topics Concern     Not on file " "    Social History Narrative     Past Surgical History:   Procedure Laterality Date     GYN SURGERY  2017    right ovary removed, endometriosis, fallopian tubes removed       Current Outpatient Prescriptions on File Prior to Visit:  Acetaminophen (TYLENOL PO)    albuterol (PROAIR HFA/PROVENTIL HFA/VENTOLIN HFA) 108 (90 Base) MCG/ACT inhaler Inhale 2 puffs into the lungs every 6 hours   Calcium-Magnesium-Vitamin D 300- MG-MG-UNIT CHEW    IBUPROFEN PO    nitroGLYcerin (NITROSTAT) 0.4 MG sublingual tablet Place 0.4 mg under the tongue   propranolol (INDERAL LA) 80 MG 24 hr capsule Take 80 mg by mouth     No current facility-administered medications on file prior to visit.      Allergies: Codeine      There is no immunization history on file for this patient.     ROS:  EYES: NEGATIVE for vision changes   RESP: NEGATIVE for significant cough or SOB  CV: NEGATIVE for chest pain, palpitations   GI: NEGATIVE for  abdominal pain, heartburn, or change in bowel habits  : NEGATIVE for frequency, dysuria, or hematuria  MUSCULOSKELETAL: NEGATIVE for significant arthralgias or myalgia  NEURO: NEGATIVE for weakness, dizziness or paresthesias or headache  PSYCHIATRIC: NEGATIVE for changes in mood or affect    OBJECTIVE:  /74 (BP Location: Right arm, Patient Position: Chair, Cuff Size: Adult Regular)  Pulse 68  Temp 97.9  F (36.6  C) (Oral)  Resp 20  Ht 1.664 m (5' 5.5\")  Wt 67.1 kg (148 lb)  LMP 08/06/2018  Breastfeeding? No  BMI 24.25 kg/m2  EXAM:  GENERAL APPEARANCE: healthy, alert and no distress  EYES: EOMI,  PERRL  HENT: ear canals and TM's normal and nose and mouth without ulcers or lesions  RESP: lungs clear to auscultation - no rales, rhonchi or wheezes  CV: regular rates and rhythm, normal S1 S2, no S3 or S4 and no murmur, click or rub -  ABDOMEN:  soft, nontender, no HSM or masses and bowel sounds normal  MS: extremities normal- no gross deformities noted, no evidence of inflammation in joints, FROM " in all extremities.  MS: slight tenderness to percussion of left flank-no rash noted, no echymoses, no pain with muscular stretch  SKIN: no suspicious lesions or rashes  PSYCH: mentation appears normal and affect normal/bright    ASSESSMENT/PLAN:  (N20.0) Calculus of kidney  (primary encounter diagnosis)  Comment: pt with known nonobstructing stones per CT-plan to see Dr Greenwood in 6 mo  Plan: HCL  Urinalysis, Routine (BFP), CL AFF         HEMOGRAM/PLATE/DIFF (BFP), ESR, WESTERGREN         (BFP), COMPREHENSIVE METABOLIC PANEL ([facility])         XCMP, VENOUS COLLECTION            (Z87.448) Hx of pyelonephritis  Comment: non clear signs infection today-? If symptoms may be related to scar tissue from previous pyelo-pt worried we are missing something  Plan: HCL  Urinalysis, Routine (BFP), CL AFF         HEMOGRAM/PLATE/DIFF (BFP), ESR, WESTERGREN         (BFP), COMPREHENSIVE METABOLIC PANEL ([facility])         XCMP, VENOUS COLLECTION        Will place call to Dr Greenwood to discuss if further imaging or eval warranted at 7 weeks time given symptoms     Labs as ordered    (R10.9) Left flank pain  Comment: as above  Plan: CL AFF HEMOGRAM/PLATE/DIFF (BFP), ESR,         WESTERGREN (BFP), COMPREHENSIVE METABOLIC PANEL        ([facility]) XCMP, VENOUS COLLECTION, Urine Culture         Aerobic Bacterial        Call placed to Dr Greenwood

## 2018-08-27 NOTE — NURSING NOTE
Amanda Cox is here for a recheck. Still having issues with lower back pain, fever and chills.    Questioned patient about current smoking habits.  Pt. has never smoked.  PULSE regular  My Chart: active  CLASSIFICATION OF OVERWEIGHT AND OBESITY BY BMI                        Obesity Class           BMI(kg/m2)  Underweight                                    < 18.5  Normal                                         18.5-24.9  Overweight                                     25.0-29.9  OBESITY                     I                  30.0-34.9                             II                 35.0-39.9  EXTREME OBESITY             III                >40                            Patient's  BMI Body mass index is 24.25 kg/(m^2).  http://hin.nhlbi.nih.gov/menuplanner/menu.cgi  Pre-visit planning  Immunizations - up to date per Pt  Colonoscopy -   Mammogram - is up to date  Asthma -   PHQ9 -    ANUP-7 -

## 2018-08-28 LAB
ALBUMIN SERPL-MCNC: 4.4 G/DL (ref 3.6–5.1)
ALBUMIN/GLOB SERPL: 1.5 (CALC) (ref 1–2.5)
ALP SERPL-CCNC: 53 U/L (ref 33–115)
ALT SERPL-CCNC: 7 U/L (ref 6–29)
AST SERPL-CCNC: 12 U/L (ref 10–30)
BILIRUB SERPL-MCNC: 1 MG/DL (ref 0.2–1.2)
BUN SERPL-MCNC: 19 MG/DL (ref 7–25)
BUN/CREATININE RATIO: NORMAL (CALC) (ref 6–22)
CALCIUM SERPL-MCNC: 9.7 MG/DL (ref 8.6–10.2)
CHLORIDE SERPLBLD-SCNC: 104 MMOL/L (ref 98–110)
CO2 SERPL-SCNC: 23 MMOL/L (ref 20–32)
CREAT SERPL-MCNC: 0.62 MG/DL (ref 0.5–1.1)
EGFR AFRICAN AMERICAN - QUEST: 130 ML/MIN/1.73M2
GFR SERPL CREATININE-BSD FRML MDRD: 112 ML/MIN/1.73M2
GLOBULIN, CALCULATED - QUEST: 3 G/DL (CALC) (ref 1.9–3.7)
GLUCOSE - QUEST: 95 MG/DL (ref 65–99)
POTASSIUM SERPL-SCNC: 3.8 MMOL/L (ref 3.5–5.3)
PROT SERPL-MCNC: 7.4 G/DL (ref 6.1–8.1)
SODIUM SERPL-SCNC: 137 MMOL/L (ref 135–146)

## 2018-08-29 ENCOUNTER — MYC MEDICAL ADVICE (OUTPATIENT)
Dept: FAMILY MEDICINE | Facility: CLINIC | Age: 43
End: 2018-08-29

## 2018-08-29 ENCOUNTER — TELEPHONE (OUTPATIENT)
Dept: FAMILY MEDICINE | Facility: CLINIC | Age: 43
End: 2018-08-29

## 2018-08-29 DIAGNOSIS — R10.9 FLANK PAIN: Primary | ICD-10-CM

## 2018-08-29 NOTE — TELEPHONE ENCOUNTER
D/w pt- I spoke with Dr Greenwood of urology-he does not feel pyelo would cause ongoing flank discomfort-suggested repeat  CT to look to see if stones may have moved-pt also has odd hx of ovarian issue in past found by contrast CT-she would like CT with and without contrast    Order entered, we will contact pt with plan

## 2018-08-30 ENCOUNTER — TELEPHONE (OUTPATIENT)
Dept: FAMILY MEDICINE | Facility: CLINIC | Age: 43
End: 2018-08-30

## 2018-08-30 DIAGNOSIS — N39.0 URINARY TRACT INFECTION WITHOUT HEMATURIA, SITE UNSPECIFIED: Primary | ICD-10-CM

## 2018-08-30 LAB — URINE VOIDED CULTURE: ABNORMAL

## 2018-12-10 ENCOUNTER — OFFICE VISIT (OUTPATIENT)
Dept: FAMILY MEDICINE | Facility: CLINIC | Age: 43
End: 2018-12-10

## 2018-12-10 VITALS
HEIGHT: 66 IN | OXYGEN SATURATION: 98 % | BODY MASS INDEX: 24.27 KG/M2 | TEMPERATURE: 98.4 F | HEART RATE: 71 BPM | SYSTOLIC BLOOD PRESSURE: 114 MMHG | WEIGHT: 151 LBS | RESPIRATION RATE: 16 BRPM | DIASTOLIC BLOOD PRESSURE: 82 MMHG

## 2018-12-10 DIAGNOSIS — N30.00 ACUTE CYSTITIS WITHOUT HEMATURIA: Primary | ICD-10-CM

## 2018-12-10 LAB
ALBUMIN (URINE): ABNORMAL MG/DL
APPEARANCE UR: ABNORMAL
BACTERIA, UR: SLIGHT
BILIRUB UR QL: ABNORMAL
CASTS/LPF: ABNORMAL
COLOR UR: YELLOW
EP/HPF: ABNORMAL
GLUCOSE URINE: ABNORMAL MG/DL
HGB UR QL: ABNORMAL
KETONES UR QL: ABNORMAL MG/DL
LEUKOCYTE ESTERASE - QUEST: ABNORMAL
MISC.: ABNORMAL
NITRITE UR QL STRIP: ABNORMAL
PH UR STRIP: 6 PH (ref 5–7)
RBC, UR MICRO: ABNORMAL (ref ?–2)
SP. GRAVITY: 1.02
UROBILINOGEN UR QL STRIP: 0.2 EU/DL (ref 0.2–1)
WBC, UR MICRO: ABNORMAL (ref ?–2)

## 2018-12-10 PROCEDURE — 99213 OFFICE O/P EST LOW 20 MIN: CPT | Performed by: FAMILY MEDICINE

## 2018-12-10 PROCEDURE — 81001 URINALYSIS AUTO W/SCOPE: CPT | Performed by: FAMILY MEDICINE

## 2018-12-10 RX ORDER — SULFAMETHOXAZOLE/TRIMETHOPRIM 800-160 MG
1 TABLET ORAL 2 TIMES DAILY
Qty: 6 TABLET | Refills: 0 | Status: SHIPPED | OUTPATIENT
Start: 2018-12-10 | End: 2018-12-13

## 2018-12-10 SDOH — ECONOMIC STABILITY: FOOD INSECURITY: WITHIN THE PAST 12 MONTHS, YOU WORRIED THAT YOUR FOOD WOULD RUN OUT BEFORE YOU GOT MONEY TO BUY MORE.: NEVER TRUE

## 2018-12-10 SDOH — ECONOMIC STABILITY: INCOME INSECURITY: HOW HARD IS IT FOR YOU TO PAY FOR THE VERY BASICS LIKE FOOD, HOUSING, MEDICAL CARE, AND HEATING?: NOT HARD AT ALL

## 2018-12-10 SDOH — ECONOMIC STABILITY: FOOD INSECURITY: WITHIN THE PAST 12 MONTHS, THE FOOD YOU BOUGHT JUST DIDN'T LAST AND YOU DIDN'T HAVE MONEY TO GET MORE.: NEVER TRUE

## 2018-12-10 ASSESSMENT — MIFFLIN-ST. JEOR: SCORE: 1358.74

## 2018-12-10 NOTE — PATIENT INSTRUCTIONS
Patient was instructed to drink plenty of fluids, urinate frequently and to contact the office promptly should she notice fever greater than 102, increase in discomfort, skin rash, lack of improvement after 2 days of treatment, or the appearance of new symptoms.    Follow up with Urology

## 2018-12-10 NOTE — PROGRESS NOTES
"SUBJECTIVE:   Amanda Cox is an 43 year old year old who presents with suspected urinary tract infection. Her symptoms began 2 months ago ago and include burning with urination on occasion. Vaginal discharge noted at times with normal GYN exam 8/2018.     Predisposing factors: right ovary removed along with Fallopian tubes/ due to benign cyst. Left ovary left in. Spotting noted mid cycle and 5 days of regular menses.  No estrogens due to TIA at age 36  Hx of previous UTI s: 7/2018 with a kidney stone, passed one.   Sexually active: yes, single partner    Current Outpatient Medications   Medication     Acetaminophen (TYLENOL PO)     albuterol (PROAIR HFA/PROVENTIL HFA/VENTOLIN HFA) 108 (90 Base) MCG/ACT inhaler     Calcium-Magnesium-Vitamin D 300- MG-MG-UNIT CHEW     IBUPROFEN PO     nitroGLYcerin (NITROSTAT) 0.4 MG sublingual tablet     Probiotic Product (PROBIOTIC-10 PO)     propranolol (INDERAL LA) 80 MG 24 hr capsule     amoxicillin-clavulanate (AUGMENTIN) 875-125 MG per tablet     No current facility-administered medications for this visit.           Allergies   Allergen Reactions     Codeine Nausea and Vomiting       OBJECTIVE:   Vitals:    12/10/18 1207   BP: 114/82   BP Location: Right arm   Patient Position: Sitting   Cuff Size: Adult Regular   Pulse: 71   Temp: 98.4  F (36.9  C)   TempSrc: Oral   SpO2: 98%   Weight: 68.5 kg (151 lb)   Height: 1.664 m (5' 5.5\")       General appearance: Alert, oriented, well hydrated. Skin turgor normal.    Hydration: well hydrated   CVA tenderness to percussion: none  Abdomen: The abdomen is soft without tenderness, guarding, mass or organomegaly. Bowel sounds are normal. No CVA tenderness or inguinal adenopathy noted.      Tenderness: none   Masses: none  Organomegaly: none    UA:Urine dipstick shows positive for RBC's, positive for leukocytes and cloudy.  Micro exam: 10-25 WBC's per HPF, 2-5 RBC's per HPF and trace bacteria.     ASSESSMENT/PLAN: (N30.00) Acute " cystitis without hematuria  (primary encounter diagnosis)  Comment: will send results to DR Greenwood. Urology    Plan: HCL  Urinalysis, Routine (BFP), Urine Culture         Aerobic Bacterial,         sulfamethoxazole-trimethoprim (BACTRIM         DS/SEPTRA DS) 800-160 MG tablet        Patient was instructed to drink plenty of fluids, urinate frequently and to contact the office promptly should she notice fever greater than 102, increase in discomfort, skin rash, lack of improvement after 2 days of treatment, or the appearance of new symptoms.

## 2018-12-10 NOTE — PROGRESS NOTES
"SUBJECTIVE:   Amanda Cox is an 43 year old year old who presents with suspected urinary tract infection. Her symptoms began 2 months ago ago and include bruning with urination on occasion. Vaginal discharge noted at times with normal GYN exam 8/2018.     Predisposing factors: right ovary removed along with Fallopian tubes/ due to benign cyst. Left ovary left in. Spotting noted mid cycle and 5 days of regualr menses.  No estrogens due to TIA at age 36  Hx of previous UTI s: 7/2018 with a kidney stone, passed one.   Sexually active: yes, single partner    Current Outpatient Medications   Medication     Acetaminophen (TYLENOL PO)     albuterol (PROAIR HFA/PROVENTIL HFA/VENTOLIN HFA) 108 (90 Base) MCG/ACT inhaler     Calcium-Magnesium-Vitamin D 300- MG-MG-UNIT CHEW     IBUPROFEN PO     nitroGLYcerin (NITROSTAT) 0.4 MG sublingual tablet     Probiotic Product (PROBIOTIC-10 PO)     propranolol (INDERAL LA) 80 MG 24 hr capsule     amoxicillin-clavulanate (AUGMENTIN) 875-125 MG per tablet     No current facility-administered medications for this visit.           Allergies   Allergen Reactions     Codeine Nausea and Vomiting       OBJECTIVE:   Vitals:    12/10/18 1207   BP: 114/82   BP Location: Right arm   Patient Position: Sitting   Cuff Size: Adult Regular   Pulse: 71   Temp: 98.4  F (36.9  C)   TempSrc: Oral   SpO2: 98%   Weight: 68.5 kg (151 lb)   Height: 1.664 m (5' 5.5\")       General appearance: Alert, oriented, well hydrated. Skin turgor normal.    Hydration: well hydrated   CVA tenderness to percussion: none  Abdomen: The abdomen is soft without tenderness, guarding, mass or organomegaly. Bowel sounds are normal. No CVA tenderness or inguinal adenopathy noted.    Tenderness: none   Masses: none  Organomegaly: none    UA:Urine dipstick shows positive for RBC's, positive for leukocytes and cloudy.  Micro exam: 10-25 WBC's per HPF, 2-5 RBC's per HPF and trace bacteria.       "

## 2018-12-10 NOTE — NURSING NOTE
Amanda is here for a possible UTI- burning and low abdominal pain            Pre-visit Screening:  Immunizations:  unknown  Colonoscopy:  NA  Mammogram: is up to date  Asthma Action Test/Plan:  Allergy induced  PHQ9:  none  GAD7:  none  Questioned patient about current smoking habits Pt. has never smoked.  Ok to leave detailed message on voice mail for today's visit only Yes, phone # [phone]

## 2018-12-13 ENCOUNTER — TELEPHONE (OUTPATIENT)
Dept: FAMILY MEDICINE | Facility: CLINIC | Age: 43
End: 2018-12-13

## 2018-12-13 ENCOUNTER — MYC MEDICAL ADVICE (OUTPATIENT)
Dept: FAMILY MEDICINE | Facility: CLINIC | Age: 43
End: 2018-12-13

## 2018-12-14 NOTE — TELEPHONE ENCOUNTER
From: Amanda Cox  To: Radha Cooney MD  Sent: 12/13/2018 9:37 PM CST  Subject: A follow-up question for a visit within the past seven days    Radha,  My culture hasn't come back yet. Finished my antibiotics and wondering what the next step is. I still have the random burning when I pee. It did get a little better but hard to tell after three days. Still have the 2 or 3 am empty bladder pain.   Amanda

## 2018-12-17 ENCOUNTER — MYC MEDICAL ADVICE (OUTPATIENT)
Dept: FAMILY MEDICINE | Facility: CLINIC | Age: 43
End: 2018-12-17

## 2018-12-17 DIAGNOSIS — N30.00 ACUTE CYSTITIS WITHOUT HEMATURIA: Primary | ICD-10-CM

## 2018-12-17 LAB
ALBUMIN (URINE): ABNORMAL MG/DL
APPEARANCE UR: ABNORMAL
BACTERIA, UR: ABNORMAL
BILIRUB UR QL: ABNORMAL
CASTS/LPF: ABNORMAL
COLOR UR: YELLOW
EP/HPF: ABNORMAL
GLUCOSE URINE: ABNORMAL MG/DL
HGB UR QL: ABNORMAL
KETONES UR QL: ABNORMAL MG/DL
LEUKOCYTE ESTERASE - QUEST: ABNORMAL
MISC.: ABNORMAL
NITRITE UR QL STRIP: ABNORMAL
PH UR STRIP: 6.5 PH (ref 5–7)
RBC, UR MICRO: ABNORMAL (ref ?–2)
SP. GRAVITY: 1.02
UROBILINOGEN UR QL STRIP: 0.2 EU/DL (ref 0.2–1)
WBC, UR MICRO: ABNORMAL (ref ?–2)

## 2018-12-17 PROCEDURE — 81001 URINALYSIS AUTO W/SCOPE: CPT | Performed by: FAMILY MEDICINE

## 2018-12-17 NOTE — TELEPHONE ENCOUNTER
From: Matilde Guzman MLT  To: Amanda Cox  Sent: 12/17/2018 10:51 AM CST  Subject: Urine Culture    Kike Patel,     I left you a message on your voicemail regarding your urine culture.  We were unable to send out a urine culture, so that test did not get performed.  I'm very sorry for the inconvenience.  Are you still having symptoms?  If so, would you be able to come in and leave a new urine sample?  It would just be a lab only and we can run a new urinalysis and sent out a culture.  Let me know if you would like to come in and I can get you scheduled.     Thanks, Matilde.

## 2018-12-20 ENCOUNTER — MYC MEDICAL ADVICE (OUTPATIENT)
Dept: FAMILY MEDICINE | Facility: CLINIC | Age: 43
End: 2018-12-20

## 2018-12-20 DIAGNOSIS — N30.00 ACUTE CYSTITIS WITHOUT HEMATURIA: Primary | ICD-10-CM

## 2018-12-20 LAB — URINE VOIDED CULTURE: ABNORMAL

## 2018-12-21 NOTE — TELEPHONE ENCOUNTER
From: Amanda Cox  To: Radha Cooney MD  Sent: 12/20/2018 6:17 PM CST  Subject: A follow-up question for a visit within the past seven days    Dr. Cooney,  With the culture back and I am not seeing Dr. Carrillo till Feb 6th, should we be doing something? Just wondering.   Let me know. Still have symptoms some days worse than others. Just depends.   I use to take sulfmate/ bactrim for Acne before surgery and never had bladder issues. Wondering if we could just go back on it till I see Lorena in Feb.   Amanda

## 2018-12-22 RX ORDER — SULFAMETHOXAZOLE/TRIMETHOPRIM 800-160 MG
1 TABLET ORAL 2 TIMES DAILY
Qty: 20 TABLET | Refills: 0 | Status: SHIPPED | OUTPATIENT
Start: 2018-12-22 | End: 2019-01-01

## 2019-01-22 NOTE — PROGRESS NOTES
Order(s) created erroneously. Erroneous order ID: [id number]   Order canceled by: JAZMIN AYALA   Order cancel date/time: 01/22/2019 10:32 AM

## 2019-05-09 DIAGNOSIS — G43.009 MIGRAINE WITHOUT AURA AND WITHOUT STATUS MIGRAINOSUS, NOT INTRACTABLE: Primary | ICD-10-CM

## 2019-05-09 DIAGNOSIS — I10 BENIGN ESSENTIAL HYPERTENSION: ICD-10-CM

## 2019-05-09 RX ORDER — PROPRANOLOL HYDROCHLORIDE 80 MG/1
80 CAPSULE, EXTENDED RELEASE ORAL DAILY
Qty: 90 CAPSULE | Refills: 0 | Status: SHIPPED | OUTPATIENT
Start: 2019-05-09

## 2019-05-09 NOTE — TELEPHONE ENCOUNTER
Pt came in to establish care on 08/14/18. She seen a few providers here since then. She stated at the time she still had propranolol. She is now out and needs a refill.    Amanda Cox is requesting a refill of:    Pending Prescriptions:                       Disp   Refills    propranolol ER (INDERAL LA) 80 MG 24 hr c*                    Sig: Take 1 capsule (80 mg) by mouth daily

## 2019-07-08 ENCOUNTER — TRANSFERRED RECORDS (OUTPATIENT)
Dept: FAMILY MEDICINE | Facility: CLINIC | Age: 44
End: 2019-07-08

## 2019-08-05 DIAGNOSIS — G43.009 MIGRAINE WITHOUT AURA AND WITHOUT STATUS MIGRAINOSUS, NOT INTRACTABLE: ICD-10-CM

## 2019-08-05 DIAGNOSIS — I10 BENIGN ESSENTIAL HYPERTENSION: ICD-10-CM

## 2019-08-05 NOTE — TELEPHONE ENCOUNTER
Pending Prescriptions:                       Disp   Refills    propranolol ER (INDERAL LA) 80 MG 24 hr c*30 cap*0            Sig: TAKE 1 CAPSULE (80 MG) BY MOUTH DAILY    LMOM to see if pt still coming here  Per chart est care on 7-8-2019 Family Practice with Louann Juan?  See RE on 5-9-19 see's multiple providers?  Didi  [phone] (home) none (work)

## 2019-08-06 RX ORDER — PROPRANOLOL HYDROCHLORIDE 80 MG/1
80 CAPSULE, EXTENDED RELEASE ORAL DAILY
Qty: 30 CAPSULE | Refills: 0 | OUTPATIENT
Start: 2019-08-06

## 2020-02-15 ENCOUNTER — APPOINTMENT (OUTPATIENT)
Dept: CT IMAGING | Facility: CLINIC | Age: 45
End: 2020-02-15
Attending: EMERGENCY MEDICINE
Payer: COMMERCIAL

## 2020-02-15 ENCOUNTER — HOSPITAL ENCOUNTER (EMERGENCY)
Facility: CLINIC | Age: 45
Discharge: HOME | End: 2020-02-15
Attending: EMERGENCY MEDICINE | Admitting: EMERGENCY MEDICINE
Payer: COMMERCIAL

## 2020-02-15 VITALS
HEART RATE: 70 BPM | DIASTOLIC BLOOD PRESSURE: 79 MMHG | WEIGHT: 159.17 LBS | TEMPERATURE: 97.9 F | BODY MASS INDEX: 26.08 KG/M2 | SYSTOLIC BLOOD PRESSURE: 126 MMHG | OXYGEN SATURATION: 98 % | RESPIRATION RATE: 26 BRPM

## 2020-02-15 DIAGNOSIS — I30.0 ACUTE IDIOPATHIC PERICARDITIS: ICD-10-CM

## 2020-02-15 LAB
ANION GAP SERPL CALCULATED.3IONS-SCNC: 4 MMOL/L (ref 3–14)
BASOPHILS # BLD AUTO: 0 10E9/L (ref 0–0.2)
BASOPHILS NFR BLD AUTO: 0.2 %
BUN SERPL-MCNC: 13 MG/DL (ref 7–30)
CALCIUM SERPL-MCNC: 9.3 MG/DL (ref 8.5–10.1)
CHLORIDE SERPL-SCNC: 106 MMOL/L (ref 94–109)
CO2 SERPL-SCNC: 26 MMOL/L (ref 20–32)
CREAT SERPL-MCNC: 0.6 MG/DL (ref 0.52–1.04)
CRP SERPL-MCNC: <2.9 MG/L (ref 0–8)
DIFFERENTIAL METHOD BLD: NORMAL
EOSINOPHIL # BLD AUTO: 0 10E9/L (ref 0–0.7)
EOSINOPHIL NFR BLD AUTO: 0.7 %
ERYTHROCYTE [DISTWIDTH] IN BLOOD BY AUTOMATED COUNT: 14.8 % (ref 10–15)
ERYTHROCYTE [SEDIMENTATION RATE] IN BLOOD BY WESTERGREN METHOD: 5 MM/H (ref 0–20)
GFR SERPL CREATININE-BSD FRML MDRD: >90 ML/MIN/{1.73_M2}
GLUCOSE SERPL-MCNC: 103 MG/DL (ref 70–99)
HCT VFR BLD AUTO: 39.6 % (ref 35–47)
HGB BLD-MCNC: 12.7 G/DL (ref 11.7–15.7)
IMM GRANULOCYTES # BLD: 0 10E9/L (ref 0–0.4)
IMM GRANULOCYTES NFR BLD: 0.2 %
LYMPHOCYTES # BLD AUTO: 1.3 10E9/L (ref 0.8–5.3)
LYMPHOCYTES NFR BLD AUTO: 29.8 %
MCH RBC QN AUTO: 29.2 PG (ref 26.5–33)
MCHC RBC AUTO-ENTMCNC: 32.1 G/DL (ref 31.5–36.5)
MCV RBC AUTO: 91 FL (ref 78–100)
MONOCYTES # BLD AUTO: 0.4 10E9/L (ref 0–1.3)
MONOCYTES NFR BLD AUTO: 9.9 %
NEUTROPHILS # BLD AUTO: 2.6 10E9/L (ref 1.6–8.3)
NEUTROPHILS NFR BLD AUTO: 59.2 %
NRBC # BLD AUTO: 0 10*3/UL
NRBC BLD AUTO-RTO: 0 /100
PLATELET # BLD AUTO: 200 10E9/L (ref 150–450)
POTASSIUM SERPL-SCNC: 3.7 MMOL/L (ref 3.4–5.3)
RBC # BLD AUTO: 4.35 10E12/L (ref 3.8–5.2)
SODIUM SERPL-SCNC: 136 MMOL/L (ref 133–144)
TROPONIN I SERPL-MCNC: <0.015 UG/L (ref 0–0.04)
TROPONIN I SERPL-MCNC: <0.015 UG/L (ref 0–0.04)
WBC # BLD AUTO: 4.4 10E9/L (ref 4–11)

## 2020-02-15 PROCEDURE — 25800030 ZZH RX IP 258 OP 636: Performed by: EMERGENCY MEDICINE

## 2020-02-15 PROCEDURE — 80048 BASIC METABOLIC PNL TOTAL CA: CPT | Performed by: EMERGENCY MEDICINE

## 2020-02-15 PROCEDURE — 85652 RBC SED RATE AUTOMATED: CPT | Performed by: EMERGENCY MEDICINE

## 2020-02-15 PROCEDURE — 25000128 H RX IP 250 OP 636: Performed by: EMERGENCY MEDICINE

## 2020-02-15 PROCEDURE — 96375 TX/PRO/DX INJ NEW DRUG ADDON: CPT

## 2020-02-15 PROCEDURE — 84484 ASSAY OF TROPONIN QUANT: CPT | Performed by: EMERGENCY MEDICINE

## 2020-02-15 PROCEDURE — 71275 CT ANGIOGRAPHY CHEST: CPT

## 2020-02-15 PROCEDURE — 93005 ELECTROCARDIOGRAM TRACING: CPT

## 2020-02-15 PROCEDURE — 85025 COMPLETE CBC W/AUTO DIFF WBC: CPT | Performed by: EMERGENCY MEDICINE

## 2020-02-15 PROCEDURE — 86140 C-REACTIVE PROTEIN: CPT | Performed by: EMERGENCY MEDICINE

## 2020-02-15 PROCEDURE — 25000125 ZZHC RX 250: Performed by: EMERGENCY MEDICINE

## 2020-02-15 PROCEDURE — 99285 EMERGENCY DEPT VISIT HI MDM: CPT | Mod: 25

## 2020-02-15 PROCEDURE — 96374 THER/PROPH/DIAG INJ IV PUSH: CPT | Mod: 59

## 2020-02-15 PROCEDURE — 96361 HYDRATE IV INFUSION ADD-ON: CPT

## 2020-02-15 RX ORDER — IOPAMIDOL 755 MG/ML
500 INJECTION, SOLUTION INTRAVASCULAR ONCE
Status: COMPLETED | OUTPATIENT
Start: 2020-02-15 | End: 2020-02-15

## 2020-02-15 RX ORDER — ONDANSETRON 2 MG/ML
4 INJECTION INTRAMUSCULAR; INTRAVENOUS EVERY 30 MIN PRN
Status: DISCONTINUED | OUTPATIENT
Start: 2020-02-15 | End: 2020-02-15 | Stop reason: HOSPADM

## 2020-02-15 RX ORDER — KETOROLAC TROMETHAMINE 15 MG/ML
15 INJECTION, SOLUTION INTRAMUSCULAR; INTRAVENOUS ONCE
Status: COMPLETED | OUTPATIENT
Start: 2020-02-15 | End: 2020-02-15

## 2020-02-15 RX ORDER — TRAMADOL HYDROCHLORIDE 50 MG/1
50 TABLET ORAL EVERY 6 HOURS PRN
Qty: 10 TABLET | Refills: 0 | Status: SHIPPED | OUTPATIENT
Start: 2020-02-15 | End: 2020-05-08

## 2020-02-15 RX ORDER — MORPHINE SULFATE 4 MG/ML
4 INJECTION, SOLUTION INTRAMUSCULAR; INTRAVENOUS
Status: DISCONTINUED | OUTPATIENT
Start: 2020-02-15 | End: 2020-02-15 | Stop reason: HOSPADM

## 2020-02-15 RX ADMIN — MORPHINE SULFATE 4 MG: 4 INJECTION INTRAVENOUS at 09:36

## 2020-02-15 RX ADMIN — SODIUM CHLORIDE, POTASSIUM CHLORIDE, SODIUM LACTATE AND CALCIUM CHLORIDE 1000 ML: 600; 310; 30; 20 INJECTION, SOLUTION INTRAVENOUS at 09:40

## 2020-02-15 RX ADMIN — ONDANSETRON 4 MG: 2 INJECTION INTRAMUSCULAR; INTRAVENOUS at 09:30

## 2020-02-15 RX ADMIN — KETOROLAC TROMETHAMINE 15 MG: 15 INJECTION, SOLUTION INTRAMUSCULAR; INTRAVENOUS at 09:28

## 2020-02-15 RX ADMIN — IOPAMIDOL 57 ML: 755 INJECTION, SOLUTION INTRAVENOUS at 10:01

## 2020-02-15 RX ADMIN — SODIUM CHLORIDE 78 ML: 9 INJECTION, SOLUTION INTRAVENOUS at 10:01

## 2020-02-15 ASSESSMENT — ENCOUNTER SYMPTOMS
FEVER: 0
SHORTNESS OF BREATH: 1
COUGH: 0
NUMBNESS: 1

## 2020-02-15 NOTE — ED AVS SNAPSHOT
[facility] Department  [address]  [facility] [address]  Phone:  [phone]  Fax:  [phone]                                    Amanda Cox   MRN: [id number]    Department:  [facility] Emergency Department   Date of Visit:  2/15/2020           After Visit Summary Signature Page    I have received my discharge instructions, and my questions have been answered. I have discussed any challenges I see with this plan with the nurse or doctor.    ..........................................................................................................................................  Patient/Patient Representative Signature      ..........................................................................................................................................  Patient Representative Print Name and Relationship to Patient    ..................................................               ................................................  Date                                   Time    ..........................................................................................................................................  Reviewed by Signature/Title    ...................................................              ..............................................  Date                                               Time          22EPIC Rev 08/18

## 2020-02-15 NOTE — ED PROVIDER NOTES
History     Chief Complaint:  Shortness of breath      HPI   Amanda Cox is a 44 year old female who presents with shortness of breath. The patient developed a sudden onset of shortness of breath and chest pain last night while watching TV. Her shortness of breath and chest pain is worse with deep breathing, laying down, or getting up. Her symptoms are better when sitting up. She states that the chest pain is mainly a heaviness in the middle front of her chest but also has intermittent sharp pain in the back. She notes having left arm paresthesias as well. She took tylenol and nitroglycerin this morning without significant improvement. She denies any fevers, leg pain, leg swelling, falls, or injury. She notes prior history of both pleurisy and pericarditis in the remote past. She also notes a recent mild URI 2-3 weeks ago. No recent fevers, cough, or exertional symptoms. URI symptoms have since resolved.    Allergies:  Codeine     Medications:    propranolol ER     Past Medical History:    Hypertension   Ovarian cyst   PFO (patent foramen ovale)   TIA (transient ischemic attack)   Migraine     Past Surgical History:    Endometriosis   Fallopian tubes removed  Right ovary removed     Family History:    Coronary artery disease   Diabetes   DVT    Social History:  The patient was accompanied to the ED by .  Smoking Status: Never Smoker  Smokeless Tobacco: Never Used  Alcohol Use: Yes  Drug Use: No  PCP: Kelley Shirley     Review of Systems   Constitutional: Negative for fever.   Respiratory: Positive for shortness of breath. Negative for cough.    Cardiovascular: Positive for chest pain. Negative for leg swelling.   Musculoskeletal:        No leg pain   Neurological: Positive for numbness (left arm).   All other systems reviewed and are negative.      Physical Exam     Patient Vitals for the past 24 hrs:   BP Temp Temp src Pulse Heart Rate Resp SpO2 Weight   02/15/20 1200 130/81 -- -- 65 59 27 100 %  --   02/15/20 1145 125/83 -- -- 58 61 27 100 % --   02/15/20 1130 122/87 -- -- 62 62 17 99 % --   02/15/20 1115 127/79 -- -- 64 63 11 99 % --   02/15/20 1100 134/88 -- -- 60 65 13 100 % --   02/15/20 0945 (!) 145/92 -- -- 58 61 21 99 % --   02/15/20 0930 (!) 148/89 -- -- 61 68 14 100 % --   02/15/20 0900 (!) 143/112 -- -- 70 64 22 100 % --   02/15/20 0845 (!) 164/112 -- -- 71 75 12 100 % --   02/15/20 0833 (!) 151/103 97.9  F (36.6  C) Oral -- 74 22 100 % 72.2 kg (159 lb 2.8 oz)        Physical Exam  General: Nontoxic, appears uncomfortable.  Leaning forward in bed.  Head:  Scalp, face, and head appear normal  Eyes:  Pupils are equal, round, and reactive to light    Conjunctivae non-injected and sclerae white  ENT:    The external nose is normal    Pinnae are normal    The oropharynx is normal, mucous membranes moist    Posterior pharynx clear without swelling, exudates or erythema     Uvula is in the midline  Neck:  Normal range of motion    There is no rigidity noted    Trachea is in the midline  CV:  Regular rate and rhythm     Normal S1/S2, no S3/S4    No murmur or rub. Radial pulses 2+ bilaterally   Resp:  Lungs are clear and equal bilaterally    + tachypnea, respirations shallow.    No increased work of breathing    No rales, wheezing, or rhonchi  GI:  Abdomen is soft, no rigidity or guarding    No distension, or mass    No tenderness or rebound tenderness   MS:  Normal muscular tone.  No chest wall tenderness to palpation.    Symmetric motor strength    No lower extremity edema. No calf swelling or tenderness.  Skin:  No rash or acute skin lesions noted  Neuro: Awake and alert    Speech is normal and fluent    Moves all extremities spontaneously  Psych:  Normal affect.  Appropriate interactions.      Emergency Department Course   ECG:  ECG taken at 0839, ECG read at 0850  Normal sinus rhythm  Normal ECG  Rate 77 bpm. CA interval 132 ms. QRS duration 76 ms. QT/QTc 392/443 ms. P-R-T axes 64 69 69.      Imaging:  Radiology findings were communicated with the patient who voiced understanding of the findings.    CT Chest Pulmonary Embolism w Contrast  Final Result  IMPRESSION:  1.  No acute abnormality demonstrated in the chest. Specifically,  there is no evidence of pulmonary embolism.  2.  Ascending thoracic aortic caliber is at the upper limits of  normal.  GERRY JOYNER MD  Reading per radiology     Laboratory:  Laboratory findings were communicated with the patient who voiced understanding of the findings.    Troponin(1205):  <0.015      CBC:  WBC 4.4, HGB 12.7, , o/w WNL     BMP: Glucose 103 (H), o/w WNL (Creatinine: 0.60)     Troponin(0851):  <0.015      CRP inflammation: <2.9     Erythrocyte sedimentation rate auto: 5    Interventions:  0928 Toradol 15 mg IV   0930 zofran 4 mg IV   0936 Morphine 4 mg IV  0940 lactated ringers BOLUS 1,000 mL IV     Emergency Department Course:  Past medical records, nursing notes, and vitals reviewed.    0844 I performed an exam of the patient as documented above.    IV was inserted and blood was drawn for laboratory testing, results above.     The patient was sent for a CT chest pulmonary embolism while in the emergency department, results above.       1240 Patient rechecked and updated.       Findings and plan explained to the Patient. Patient discharged home with instructions regarding supportive care, medications, and reasons to return. The importance of close follow-up was reviewed. The patient was prescribed acetaminophen, naproxen, and tramadol.       Impression & Plan     Medical Decision Making:  Amanda Cox is a 44 year old female who presents to the emergency department today with pleuritic positional chest pain worse when lying down better when sitting up with a history of recent viral URI. On my evaluation she appears uncomfortable but otherwise is hemodynamically stable. She is tachycardic. No hypotension. A broad differential diagnosis is  considered including pneumonia, pericarditis, myocarditis, pleurisy, pleural effusions, empyema, pulmonary embolism, pneumonia among others. Acute coronary syndrome is considered but felt to be unlikely. She has no history of cardiac coronary disease and the symptoms would be atypical for this. Workup in the ED revealed reassuring EKG without any ST changes. No evidence of heart strain. CBC and BMP are unremarkable. No leukocytosis. CRP and sed rate are normal. Troponin are undetectable x2. Ultimately the clinical signs and symptoms are consistent with pericarditis despite the normal EKG changes. CTA of the chest was obtained and was negative for any acute findings. No PE, none of the other above concerning pathologies were seen. There is no pericardial effusion or thickening of the pericardium. At this time I recommended supportive care with NSAIDs, tramadol, and tylenol for pain and close follow up with PCP and/or Cardiologist if not improving in 1 week. The patient was agreeable with the plan of care. Close return precautions were provided and she was discharged in stable and improved condition.     Discharge Diagnosis:    ICD-10-CM    1. Acute idiopathic pericarditis I30.0        Disposition:  Discharged to home.    Discharge Medications:  New Prescriptions    ACETAMINOPHEN 500 MG CAPS    Take 2 capsules by mouth every 8 hours as needed For aches, pain, fever    NAPROXEN (NAPROSYN) 375 MG TABLET    Take 1 tablet (375 mg) by mouth 2 times daily (with meals) for 10 days    TRAMADOL (ULTRAM) 50 MG TABLET    Take 1 tablet (50 mg) by mouth every 6 hours as needed for severe pain       Scribe Disclosure:  Bhavesh AGUILAR, am serving as a scribe at 8:44 AM on 2/15/2020 to document services personally performed by Reji Jones MD based on my observations and the provider's statements to me.      2/15/2020   Reji Jones MD Daro, Ryan Clay, MD  02/17/20 [id number]

## 2020-02-15 NOTE — ED NOTES
Patient reports improvement in chest pressure and shortness of breath. Alert and oriented. Oxygen saturation stable on room air. MD in to see patient and discuss diagnosis, test results, and discharge plan. Patient meets discharge criteria. Discussed AVS with patient. Questions answered. Patient and spouse verbalized understanding. Patient reports being ready to go home. Patient discharged home by car with all necessary information.

## 2020-02-15 NOTE — ED NOTES
Assumed patient care for RN break. Troponin re-drawn and sent to lab.Pt denies any new or worse symptoms or concerns. Family at the bedside.  Call light in reach.

## 2020-02-15 NOTE — ED TRIAGE NOTES
Chest pain, started last night.  Took nitroglycerin last night with little relief.  Took nitroglycerin again this morning with no relief in pain.  Pt had sporadic pain through the night and didn't sleep well.

## 2020-02-16 LAB — INTERPRETATION ECG - MUSE: NORMAL

## 2020-02-19 ENCOUNTER — TELEPHONE (OUTPATIENT)
Dept: CARDIOLOGY | Facility: CLINIC | Age: 45
End: 2020-02-19

## 2020-02-19 PROBLEM — Q25.0 PATENT DUCTUS ARTERIOSUS: Status: ACTIVE | Noted: 2018-08-14

## 2020-02-19 PROBLEM — Z86.73 HISTORY OF TIA (TRANSIENT ISCHEMIC ATTACK) AND STROKE: Status: ACTIVE | Noted: 2020-02-19

## 2020-02-19 PROBLEM — I31.9 PERICARDITIS: Status: ACTIVE | Noted: 2020-02-19

## 2020-02-19 NOTE — TELEPHONE ENCOUNTER
Phone call to patient to get more background and history in preparation for her 2/20/20 visit with Dr. Walton in [place].  She told me her initial event with pericarditis was in 2005 in [place]-She was prescribed NSAIDS. Her second pericarditis event was last week when she went to [facility] ED.  She has a history of TIA/PFO. This was in 2009 when she was in [place]. She suffered a TIA ( no residual) and an echo bubble was done that confirmed a PFO.No intervention ws performed other than taking her off birth control pills. She has been fine ever since.  Her chest pain episode in 2/2018 was due to CO poisoning. After she underwent a cardiac workup it was then determined her Cp was due to CO poisoning.  Her chest pin since last week has not subsided thus she is requesting to be seen.

## 2020-02-20 ENCOUNTER — OFFICE VISIT (OUTPATIENT)
Dept: CARDIOLOGY | Facility: CLINIC | Age: 45
End: 2020-02-20
Payer: COMMERCIAL

## 2020-02-20 VITALS
BODY MASS INDEX: 26.12 KG/M2 | HEART RATE: 62 BPM | DIASTOLIC BLOOD PRESSURE: 89 MMHG | SYSTOLIC BLOOD PRESSURE: 134 MMHG | OXYGEN SATURATION: 100 % | WEIGHT: 159.4 LBS

## 2020-02-20 DIAGNOSIS — I30.0 ACUTE IDIOPATHIC PERICARDITIS: Primary | ICD-10-CM

## 2020-02-20 PROCEDURE — 99204 OFFICE O/P NEW MOD 45 MIN: CPT | Performed by: INTERNAL MEDICINE

## 2020-02-20 RX ORDER — INDOMETHACIN 50 MG/1
50 CAPSULE ORAL
Qty: 90 CAPSULE | Refills: 3 | Status: SHIPPED | OUTPATIENT
Start: 2020-02-20 | End: 2020-05-08

## 2020-02-20 RX ORDER — OMEPRAZOLE 40 MG/1
40 CAPSULE, DELAYED RELEASE ORAL DAILY
Qty: 30 CAPSULE | Refills: 3 | Status: SHIPPED | OUTPATIENT
Start: 2020-02-20 | End: 2020-05-08

## 2020-02-20 NOTE — LETTER
2/20/2020      [facility] [address] [facility] [address]  [place] [address]      RE: Amanda Cox       Dear Colleague,    I had the pleasure of seeing Amanda Cox in the [facility].    Service Date: 02/20/2020      CLINIC NOTE      HISTORY OF PRESENT ILLNESS:  It is a pleasure for me to see Amanda for evaluation of possible pericarditis.      She is a very pleasant 44-year-old lady who had this condition that was diagnosed in 2005 when she was living in [place].  She does not recall all the details of that episode but does recall having a cardiac MR.  She does not recall what treatment was given.      In 2009, she was under a tremendous amount of stress and had a TIA.  Apparently, she had a small PFO but it was thought that her TIA may have been due to a birth control pill that she was on at the time.  She recalls a discussion as to whether she should be on warfarin but in the end, it was decided by a multispecialty team that she did not need to be on anything other than aspirin.  Fortunately, she has not had any further TIAs since then.      This lady does have a history of hypertension as well as migraines.  She takes propranolol which treats both conditions remarkably well and she has not had any migrainous attacks in 3 years.      She does have a very positive family history of premature atherosclerotic disease.  Her father had coronary artery disease diagnosed when he was 34.  She has multiple paternal uncles and aunts all with premature atherosclerotic disease as well.  In 2018, she had atypical chest discomfort and saw my former colleague, Dr. Isaiah Kirk, at [facility].  He requested a CT coronary angiogram.  This did not show any significant obstructive disease but she does have calcified plaque in her LAD.      She does not smoke, drink or abuse drugs.  She denies any significant stresses at this time.  She exercises on a  regular basis and she performs interval training on a treadmill.  She has no exertional chest discomfort or any other symptoms suggestive of angina or heart failure.      Several nights ago, she woke up with a sudden sensation of shortness of breath and substernal chest discomfort.  These symptoms were a lot worse when she lied down but it was better when she sat up.  She tells me that her daughter and  had had viral illnesses with persistent coughing for the past 2 weeks.  She herself denies any fevers.  There is no history of any connective tissue disease.  She went to the emergency room.  I personally reviewed her EKG which was unremarkable.  She had a CT scan of her chest and there was no evidence of pulmonary embolism or any other acute abnormality.  Her ascending aorta was described as upper limits of normal in size.      Cardiac examination is normal today.  However, her pain continues.  She has been given Naprosyn and tramadol.      Overall, I do think this young lady does have acute pericarditis, possibly viral.  She was given a low dose of Naprosyn.  I have changed this to indomethacin 50 mg p.o. t.i.d.  I have also prescribed her Prilosec for gastric protection.  She has a history of hemorrhoids and has been having some bright red rectal bleeding in small amounts recently.  I definitely do not think this is GI related.  I reassured her of such.        We also discussed her coronary artery disease.  It would certainly be unusual for a young lady of 44 years old to have calcified plaque in the LAD.  However, her LDL is at 53 with an HDL of 59 with a total cholesterol of 128.  She watches her diet very carefully.  With these numbers, I am not sure of the therapeutic value of starting her on a statin.  As a compromise, I recommended red rice yeast extract.  I do not think she needs to be on an aspirin.      I will have her follow up with one of my colleagues, Mariaelena Turner, in a week's time and she  will have echocardiography prior to clinic visit.  If her pain continues, perhaps we could add colchicine.  I think it would be advisable for her to complete a month's course of a nonsteroidal at a minimum if well tolerated.         EDY SCANLON MD, [facility]             D: 2020   T: 2020   MT: TESS      Name:     LEO SUTHERLAND   MRN:      [id number]        Account:      [id number]   :      1975           Service Date: 2020      Document: [id number]         Outpatient Encounter Medications as of 2020   Medication Sig Dispense Refill     acetaminophen 500 MG CAPS Take 2 capsules by mouth every 8 hours as needed For aches, pain, fever 60 capsule 0     albuterol (PROAIR HFA/PROVENTIL HFA/VENTOLIN HFA) 108 (90 Base) MCG/ACT inhaler Inhale 2 puffs into the lungs every 6 hours 3 Inhaler 1     Calcium-Magnesium-Vitamin D 300- MG-MG-UNIT CHEW        indomethacin 50 MG PO capsule Take 1 capsule (50 mg) by mouth 3 times daily (with meals) 90 capsule 3     nitroGLYcerin (NITROSTAT) 0.4 MG sublingual tablet Place 0.4 mg under the tongue       omeprazole (PRILOSEC) 40 MG PO DR capsule Take 1 capsule (40 mg) by mouth daily 30 capsule 3     Probiotic Product (PROBIOTIC-10 PO) Take by mouth daily       propranolol ER (INDERAL LA) 80 MG 24 hr capsule Take 1 capsule (80 mg) by mouth daily 90 capsule 0     [] traMADol (ULTRAM) 50 MG tablet Take 1 tablet (50 mg) by mouth every 6 hours as needed for severe pain 10 tablet 0     [DISCONTINUED] naproxen (NAPROSYN) 375 MG tablet Take 1 tablet (375 mg) by mouth 2 times daily (with meals) for 10 days 20 tablet 0     No facility-administered encounter medications on file as of 2020.        Again, thank you for allowing me to participate in the care of your patient.      Sincerely,    DR EDY SCANLON MD     [facility]

## 2020-02-20 NOTE — PROGRESS NOTES
HPI and Plan:   See dictation    Orders Placed This Encounter   Procedures     Follow-Up with Cardiac Advanced Practice Provider     Follow-Up with Cardiologist     Echocardiogram Complete       Orders Placed This Encounter   Medications     indomethacin 50 MG PO capsule     Sig: Take 1 capsule (50 mg) by mouth 3 times daily (with meals)     Dispense:  90 capsule     Refill:  3     omeprazole (PRILOSEC) 40 MG PO DR capsule     Sig: Take 1 capsule (40 mg) by mouth daily     Dispense:  30 capsule     Refill:  3       Encounter Diagnosis   Name Primary?     Acute idiopathic pericarditis Yes       CURRENT MEDICATIONS:  Current Outpatient Medications   Medication Sig Dispense Refill     acetaminophen 500 MG CAPS Take 2 capsules by mouth every 8 hours as needed For aches, pain, fever 60 capsule 0     albuterol (PROAIR HFA/PROVENTIL HFA/VENTOLIN HFA) 108 (90 Base) MCG/ACT inhaler Inhale 2 puffs into the lungs every 6 hours 3 Inhaler 1     Calcium-Magnesium-Vitamin D 300- MG-MG-UNIT CHEW        indomethacin 50 MG PO capsule Take 1 capsule (50 mg) by mouth 3 times daily (with meals) 90 capsule 3     nitroGLYcerin (NITROSTAT) 0.4 MG sublingual tablet Place 0.4 mg under the tongue       omeprazole (PRILOSEC) 40 MG PO DR capsule Take 1 capsule (40 mg) by mouth daily 30 capsule 3     Probiotic Product (PROBIOTIC-10 PO) Take by mouth daily       propranolol ER (INDERAL LA) 80 MG 24 hr capsule Take 1 capsule (80 mg) by mouth daily 90 capsule 0       ALLERGIES     Allergies   Allergen Reactions     Codeine Nausea and Vomiting       PAST MEDICAL HISTORY:  Past Medical History:   Diagnosis Date     Hypertension      Ovarian cyst      PFO (patent foramen ovale)     Diagnosed with bubble study     TIA (transient ischemic attack) Age 33       PAST SURGICAL HISTORY:  Past Surgical History:   Procedure Laterality Date     GYN SURGERY  2017    right ovary removed, endometriosis, fallopian tubes removed       FAMILY HISTORY:  Family  History   Problem Relation Age of Onset     Coronary Artery Disease Father      Diabetes Father      Coronary Artery Disease Paternal Grandmother        SOCIAL HISTORY:  Social History     Socioeconomic History     Marital status:      Spouse name: None     Number of children: 2     Years of education: None     Highest education level: None   Occupational History     Occupation: stay at home mom   Social Needs     Financial resource strain: Not hard at all     Food insecurity:     Worry: Never true     Inability: Never true     Transportation needs:     Medical: None     Non-medical: None   Tobacco Use     Smoking status: Never Smoker     Smokeless tobacco: Never Used   Substance and Sexual Activity     Alcohol use: Yes     Comment: 5 per week     Drug use: No     Sexual activity: Yes     Partners: Male     Birth control/protection: Surgical   Lifestyle     Physical activity:     Days per week: None     Minutes per session: None     Stress: None   Relationships     Social connections:     Talks on phone: None     Gets together: None     Attends Tenriism service: None     Active member of club or organization: None     Attends meetings of clubs or organizations: None     Relationship status: None     Intimate partner violence:     Fear of current or ex partner: None     Emotionally abused: None     Physically abused: None     Forced sexual activity: None   Other Topics Concern     None   Social History Narrative     None       Review of Systems:  Skin:  Positive for acne   Eyes:  Positive for glasses  ENT:  Negative    Respiratory:  Positive for shortness of breath;dyspnea on exertion  Cardiovascular:    heaviness;chest pain;Positive for;palpitations;fatigue;lightheadedness;dizziness  Gastroenterology: Negative    Genitourinary:  Negative    Musculoskeletal:  Negative    Neurologic:  Negative    Psychiatric:  Negative    Heme/Lymph/Imm:  Negative    Endocrine:  Negative      Physical Exam:  Vitals: /89  (BP Location: Left arm, Patient Position: Sitting, Cuff Size: Adult Regular)   Pulse 62   Wt 72.3 kg (159 lb 6.4 oz)   SpO2 100%   BMI 26.12 kg/m      Constitutional:  cooperative, alert and oriented, well developed, well nourished, in no acute distress        Skin:  warm and dry to the touch, no apparent skin lesions or masses noted          Head:  normocephalic, no masses or lesions        Eyes:  pupils equal and round, conjunctivae and lids unremarkable, sclera white, no xanthalasma, EOMS intact, no nystagmus        Lymph:No Cervical lymphadenopathy present     ENT:  no pallor or cyanosis, dentition good        Neck:  carotid pulses are full and equal bilaterally, JVP normal, no carotid bruit        Respiratory:  normal breath sounds, clear to auscultation, normal A-P diameter, normal symmetry, normal respiratory excursion, no use of accessory muscles         Cardiac: regular rhythm, normal S1/S2, no S3 or S4, apical impulse not displaced, no murmurs, gallops or rubs                pulses full and equal, no bruits auscultated                                        GI:  abdomen soft, non-tender, BS normoactive, no mass, no HSM, no bruits        Extremities and Muscular Skeletal:  no deformities, clubbing, cyanosis, erythema observed              Neurological:  no gross motor deficits        Psych:  Alert and Oriented x 3        Recent Lab Results:  LIPID RESULTS:  Lab Results   Component Value Date    CHOL 128 08/14/2018    HDL 59 08/14/2018    LDL 53 08/14/2018    TRIG 80 08/14/2018    CHOLHDLRATIO 2.2 08/14/2018       LIVER ENZYME RESULTS:  Lab Results   Component Value Date    AST 12 08/27/2018    ALT 7 08/27/2018       CBC RESULTS:  Lab Results   Component Value Date    WBC 4.4 02/15/2020    RBC 4.35 02/15/2020    HGB 12.7 02/15/2020    HCT 39.6 02/15/2020    MCV 91 02/15/2020    MCH 29.2 02/15/2020    MCHC 32.1 02/15/2020    RDW 14.8 02/15/2020     02/15/2020       BMP RESULTS:  Lab Results    Component Value Date     02/15/2020    POTASSIUM 3.7 02/15/2020    CHLORIDE 106 02/15/2020    CO2 26 02/15/2020    ANIONGAP 4 02/15/2020     (H) 02/15/2020    BUN 13 02/15/2020    CR 0.60 02/15/2020    GFRESTIMATED >90 02/15/2020    GFRESTBLACK >90 02/15/2020    SERGIO 9.3 02/15/2020        A1C RESULTS:  No results found for: A1C    INR RESULTS:  No results found for: INR        CC  Cherelle Martell NP  [facility]  [address] [place] [address]

## 2020-02-20 NOTE — PROGRESS NOTES
Service Date: 02/20/2020      CLINIC NOTE      HISTORY OF PRESENT ILLNESS:  It is a pleasure for me to see Amanda for evaluation of possible pericarditis.      She is a very pleasant 44-year-old lady who had this condition that was diagnosed in 2005 when she was living in [place].  She does not recall all the details of that episode but does recall having a cardiac MR.  She does not recall what treatment was given.      In 2009, she was under a tremendous amount of stress and had a TIA.  Apparently, she had a small PFO but it was thought that her TIA may have been due to a birth control pill that she was on at the time.  She recalls a discussion as to whether she should be on warfarin but in the end, it was decided by a multispecialty team that she did not need to be on anything other than aspirin.  Fortunately, she has not had any further TIAs since then.      This lady does have a history of hypertension as well as migraines.  She takes propranolol which treats both conditions remarkably well and she has not had any migrainous attacks in 3 years.      She does have a very positive family history of premature atherosclerotic disease.  Her father had coronary artery disease diagnosed when he was 34.  She has multiple paternal uncles and aunts all with premature atherosclerotic disease as well.  In 2018, she had atypical chest discomfort and saw my former colleague, Dr. Isaiah Kirk, at [facility].  He requested a CT coronary angiogram.  This did not show any significant obstructive disease but she does have calcified plaque in her LAD.      She does not smoke, drink or abuse drugs.  She denies any significant stresses at this time.  She exercises on a regular basis and she performs interval training on a treadmill.  She has no exertional chest discomfort or any other symptoms suggestive of angina or heart failure.      Several nights ago, she woke up with a sudden sensation of shortness of breath  and substernal chest discomfort.  These symptoms were a lot worse when she lied down but it was better when she sat up.  She tells me that her daughter and  had had viral illnesses with persistent coughing for the past 2 weeks.  She herself denies any fevers.  There is no history of any connective tissue disease.  She went to the emergency room.  I personally reviewed her EKG which was unremarkable.  She had a CT scan of her chest and there was no evidence of pulmonary embolism or any other acute abnormality.  Her ascending aorta was described as upper limits of normal in size.      Cardiac examination is normal today.  However, her pain continues.  She has been given Naprosyn and tramadol.      Overall, I do think this young lady does have acute pericarditis, possibly viral.  She was given a low dose of Naprosyn.  I have changed this to indomethacin 50 mg p.o. t.i.d.  I have also prescribed her Prilosec for gastric protection.  She has a history of hemorrhoids and has been having some bright red rectal bleeding in small amounts recently.  I definitely do not think this is upper GI related.  I reassured her of such.        We also discussed her coronary artery disease.  It would certainly be unusual for a young lady of 44 years old to have calcified plaque in the LAD.  However, her LDL is at 53 with an HDL of 59 with a total cholesterol of 128.  She watches her diet very carefully.  With these numbers, I am not sure of the therapeutic value of starting her on a statin.  As a compromise, I recommended red rice yeast extract.  I do not think she needs to be on an aspirin.      I will have her follow up with one of my colleagues, Mariaelena Turner, in a week's time and she will have echocardiography prior to clinic visit.  If her pain continues, perhaps we could add colchicine.  I think it would be advisable for her to complete a month's course of a nonsteroidal at a minimum if well tolerated.         EDY SCANLON,  MD, FACC             D: 2020   T: 2020   MT: TESS      Name:     LEO SUTHERLAND   MRN:      [id number]        Account:      [id number]   :      1975           Service Date: 2020      Document: [id number]

## 2020-02-20 NOTE — LETTER
2/20/2020    SAMARIA LUGO  [facility] [address]  [place] [address]    RE: Amanda Cox       Dear Colleague,    I had the pleasure of seeing Amanda Cox in the [facility].    HPI and Plan:   See dictation    Orders Placed This Encounter   Procedures     Follow-Up with Cardiac Advanced Practice Provider     Follow-Up with Cardiologist     Echocardiogram Complete       Orders Placed This Encounter   Medications     indomethacin 50 MG PO capsule     Sig: Take 1 capsule (50 mg) by mouth 3 times daily (with meals)     Dispense:  90 capsule     Refill:  3     omeprazole (PRILOSEC) 40 MG PO DR capsule     Sig: Take 1 capsule (40 mg) by mouth daily     Dispense:  30 capsule     Refill:  3       Encounter Diagnosis   Name Primary?     Acute idiopathic pericarditis Yes       CURRENT MEDICATIONS:  Current Outpatient Medications   Medication Sig Dispense Refill     acetaminophen 500 MG CAPS Take 2 capsules by mouth every 8 hours as needed For aches, pain, fever 60 capsule 0     albuterol (PROAIR HFA/PROVENTIL HFA/VENTOLIN HFA) 108 (90 Base) MCG/ACT inhaler Inhale 2 puffs into the lungs every 6 hours 3 Inhaler 1     Calcium-Magnesium-Vitamin D 300- MG-MG-UNIT CHEW        indomethacin 50 MG PO capsule Take 1 capsule (50 mg) by mouth 3 times daily (with meals) 90 capsule 3     nitroGLYcerin (NITROSTAT) 0.4 MG sublingual tablet Place 0.4 mg under the tongue       omeprazole (PRILOSEC) 40 MG PO DR capsule Take 1 capsule (40 mg) by mouth daily 30 capsule 3     Probiotic Product (PROBIOTIC-10 PO) Take by mouth daily       propranolol ER (INDERAL LA) 80 MG 24 hr capsule Take 1 capsule (80 mg) by mouth daily 90 capsule 0       ALLERGIES     Allergies   Allergen Reactions     Codeine Nausea and Vomiting       PAST MEDICAL HISTORY:  Past Medical History:   Diagnosis Date     Hypertension      Ovarian cyst      PFO (patent foramen ovale)     Diagnosed with bubble  study     TIA (transient ischemic attack) Age 33       PAST SURGICAL HISTORY:  Past Surgical History:   Procedure Laterality Date     GYN SURGERY  2017    right ovary removed, endometriosis, fallopian tubes removed       FAMILY HISTORY:  Family History   Problem Relation Age of Onset     Coronary Artery Disease Father      Diabetes Father      Coronary Artery Disease Paternal Grandmother        SOCIAL HISTORY:  Social History     Socioeconomic History     Marital status:      Spouse name: None     Number of children: 2     Years of education: None     Highest education level: None   Occupational History     Occupation: stay at home mom   Social Needs     Financial resource strain: Not hard at all     Food insecurity:     Worry: Never true     Inability: Never true     Transportation needs:     Medical: None     Non-medical: None   Tobacco Use     Smoking status: Never Smoker     Smokeless tobacco: Never Used   Substance and Sexual Activity     Alcohol use: Yes     Comment: 5 per week     Drug use: No     Sexual activity: Yes     Partners: Male     Birth control/protection: Surgical   Lifestyle     Physical activity:     Days per week: None     Minutes per session: None     Stress: None   Relationships     Social connections:     Talks on phone: None     Gets together: None     Attends Anabaptism service: None     Active member of club or organization: None     Attends meetings of clubs or organizations: None     Relationship status: None     Intimate partner violence:     Fear of current or ex partner: None     Emotionally abused: None     Physically abused: None     Forced sexual activity: None   Other Topics Concern     None   Social History Narrative     None       Review of Systems:  Skin:  Positive for acne   Eyes:  Positive for glasses  ENT:  Negative    Respiratory:  Positive for shortness of breath;dyspnea on exertion  Cardiovascular:    heaviness;chest pain;Positive  for;palpitations;fatigue;lightheadedness;dizziness  Gastroenterology: Negative    Genitourinary:  Negative    Musculoskeletal:  Negative    Neurologic:  Negative    Psychiatric:  Negative    Heme/Lymph/Imm:  Negative    Endocrine:  Negative      Physical Exam:  Vitals: /89 (BP Location: Left arm, Patient Position: Sitting, Cuff Size: Adult Regular)   Pulse 62   Wt 72.3 kg (159 lb 6.4 oz)   SpO2 100%   BMI 26.12 kg/m       Constitutional:  cooperative, alert and oriented, well developed, well nourished, in no acute distress        Skin:  warm and dry to the touch, no apparent skin lesions or masses noted          Head:  normocephalic, no masses or lesions        Eyes:  pupils equal and round, conjunctivae and lids unremarkable, sclera white, no xanthalasma, EOMS intact, no nystagmus        Lymph:No Cervical lymphadenopathy present     ENT:  no pallor or cyanosis, dentition good        Neck:  carotid pulses are full and equal bilaterally, JVP normal, no carotid bruit        Respiratory:  normal breath sounds, clear to auscultation, normal A-P diameter, normal symmetry, normal respiratory excursion, no use of accessory muscles         Cardiac: regular rhythm, normal S1/S2, no S3 or S4, apical impulse not displaced, no murmurs, gallops or rubs                pulses full and equal, no bruits auscultated                                        GI:  abdomen soft, non-tender, BS normoactive, no mass, no HSM, no bruits        Extremities and Muscular Skeletal:  no deformities, clubbing, cyanosis, erythema observed              Neurological:  no gross motor deficits        Psych:  Alert and Oriented x 3        Recent Lab Results:  LIPID RESULTS:  Lab Results   Component Value Date    CHOL 128 08/14/2018    HDL 59 08/14/2018    LDL 53 08/14/2018    TRIG 80 08/14/2018    CHOLHDLRATIO 2.2 08/14/2018       LIVER ENZYME RESULTS:  Lab Results   Component Value Date    AST 12 08/27/2018    ALT 7 08/27/2018       CBC  RESULTS:  Lab Results   Component Value Date    WBC 4.4 02/15/2020    RBC 4.35 02/15/2020    HGB 12.7 02/15/2020    HCT 39.6 02/15/2020    MCV 91 02/15/2020    MCH 29.2 02/15/2020    MCHC 32.1 02/15/2020    RDW 14.8 02/15/2020     02/15/2020       BMP RESULTS:  Lab Results   Component Value Date     02/15/2020    POTASSIUM 3.7 02/15/2020    CHLORIDE 106 02/15/2020    CO2 26 02/15/2020    ANIONGAP 4 02/15/2020     (H) 02/15/2020    BUN 13 02/15/2020    CR 0.60 02/15/2020    GFRESTIMATED >90 02/15/2020    GFRESTBLACK >90 02/15/2020    SERGIO 9.3 02/15/2020        A1C RESULTS:  No results found for: A1C    INR RESULTS:  No results found for: INR        CC  Cherelle Martell NP  [address] [address]                  Thank you for allowing me to participate in the care of your patient.      Sincerely,     DR EDY SCANLON MD     [facility]    cc:   Cherelle Martell NP  [id number] [place] [address]

## 2020-02-21 ENCOUNTER — HOSPITAL ENCOUNTER (OUTPATIENT)
Dept: CARDIOLOGY | Facility: CLINIC | Age: 45
Discharge: HOME | End: 2020-02-21
Attending: INTERNAL MEDICINE | Admitting: INTERNAL MEDICINE
Payer: COMMERCIAL

## 2020-02-21 DIAGNOSIS — I30.0 ACUTE IDIOPATHIC PERICARDITIS: ICD-10-CM

## 2020-02-21 PROCEDURE — 93306 TTE W/DOPPLER COMPLETE: CPT | Mod: 26 | Performed by: INTERNAL MEDICINE

## 2020-02-21 PROCEDURE — 93306 TTE W/DOPPLER COMPLETE: CPT

## 2020-02-24 ENCOUNTER — TELEPHONE (OUTPATIENT)
Dept: CARDIOLOGY | Facility: CLINIC | Age: 45
End: 2020-02-24

## 2020-02-24 NOTE — TELEPHONE ENCOUNTER
Phone call to patient to inform her that her echocardiogram is normal w/o evidence for pericardial effusion. She was pleased to hear this. I asked her how she is doing and she feels she is back to 'normal'-sleeping finally and out for walks. She cancelled her NP appointment this week because she is feeling so good. She is scheduled to see Dr. Walton on May 8 in [facility]. I gave her our nurse line number should she have any questions along the way.      Anton, Jerald Mariee MD  [facility] 3             Pls let her know echo is fine, no evidence of pericardial effusion.  Aortic enlargement very mild and no concern at this time.  Thanks.

## 2020-03-15 ENCOUNTER — HEALTH MAINTENANCE LETTER (OUTPATIENT)
Age: 45
End: 2020-03-15

## 2020-05-08 ENCOUNTER — VIRTUAL VISIT (OUTPATIENT)
Dept: CARDIOLOGY | Facility: CLINIC | Age: 45
End: 2020-05-08
Attending: INTERNAL MEDICINE
Payer: COMMERCIAL

## 2020-05-08 VITALS
SYSTOLIC BLOOD PRESSURE: 127 MMHG | DIASTOLIC BLOOD PRESSURE: 85 MMHG | BODY MASS INDEX: 26.22 KG/M2 | HEART RATE: 69 BPM | WEIGHT: 160 LBS

## 2020-05-08 DIAGNOSIS — I30.0 ACUTE IDIOPATHIC PERICARDITIS: ICD-10-CM

## 2020-05-08 DIAGNOSIS — R06.09 DOE (DYSPNEA ON EXERTION): Primary | ICD-10-CM

## 2020-05-08 DIAGNOSIS — I25.10 CORONARY ARTERY DISEASE INVOLVING NATIVE CORONARY ARTERY OF NATIVE HEART WITHOUT ANGINA PECTORIS: ICD-10-CM

## 2020-05-08 PROCEDURE — 99214 OFFICE O/P EST MOD 30 MIN: CPT | Mod: 95 | Performed by: INTERNAL MEDICINE

## 2020-05-08 RX ORDER — ATORVASTATIN CALCIUM 10 MG/1
10 TABLET, FILM COATED ORAL DAILY
Qty: 30 TABLET | Refills: 3 | Status: SHIPPED | OUTPATIENT
Start: 2020-05-08

## 2020-05-08 RX ORDER — MULTIPLE VITAMINS W/ MINERALS TAB 9MG-400MCG
1 TAB ORAL DAILY
COMMUNITY

## 2020-05-08 NOTE — PROGRESS NOTES
"Amanda Cox is a 44 year old female who is being evaluated via a billable video visit.      The patient has been notified of following:     \"This video visit will be conducted via a call between you and your physician/provider. We have found that certain health care needs can be provided without the need for an in-person physical exam.  This service lets us provide the care you need with a video conversation.  If a prescription is necessary we can send it directly to your pharmacy.  If lab work is needed we can place an order for that and you can then stop by our lab to have the test done at a later time.    Video visits are billed at different rates depending on your insurance coverage.  Please reach out to your insurance provider with any questions.    If during the course of the call the physician/provider feels a video visit is not appropriate, you will not be charged for this service.\"    Patient has given verbal consent for Video visit? Yes    How would you like to obtain your AVS? Mail a copy    Patient would like the video invitation sent by: Text to cell phone: [phone]    Will anyone else be joining your video visit? No       Review Of Systems  Skin: negative  Eyes: negative  Ears/Nose/Throat: negative  Respiratory: Dyspnea on exertion- cannot work out more that 10 minutes, nagging cough that has not gerda away   Cardiovascular: negative, palpitations, chest pain and palpitations are better but still there, chest pain left side under left breast and at times takes breath away,   Gastrointestinal: negative  Genitourinary: negative  Musculoskeletal: joint pain  Neurologic: headaches and more frequently  Psychiatric: negative  Hematologic/Lymphatic/Immunologic: negative  Endocrine: negative    Patient reported vitals:  BP: 127/85  Heart rate: 69  Weight: 160lb    Denice Douglas [facility]        Video-Visit Details    Type of service:  Video Visit    Video Start Time: 7:59 AM  Video End Time: 8:21 " "AM    Originating Location (pt. Location): Home    Distant Location (provider location):  [facility]     Platform used for Video Visit: Sherrieimjulissa     Delightful 44-year-old lady who returns for follow-up of recurrent pericarditis.  History is as follows:    \"She is a very pleasant 44-year-old lady who had this condition that was diagnosed in 2005 when she was living in [place].  She does not recall all the details of that episode but does recall having a cardiac MR.  She does not recall what treatment was given.      In 2009, she was under a tremendous amount of stress and had a TIA.  Apparently, she had a small PFO but it was thought that her TIA may have been due to a birth control pill that she was on at the time.  She recalls a discussion as to whether she should be on warfarin but in the end, it was decided by a multispecialty team that she did not need to be on anything other than aspirin.  Fortunately, she has not had any further TIAs since then.      This lady does have a history of hypertension as well as migraines.  She takes propranolol which treats both conditions remarkably well and she has not had any migrainous attacks in 3 years.      She does have a very positive family history of premature atherosclerotic disease.  Her father had coronary artery disease diagnosed when he was 34.  She has multiple paternal uncles and aunts all with premature atherosclerotic disease as well.  In 2018, she had atypical chest discomfort and saw my former colleague, Dr. Isaiah Kirk, at [facility].  He requested a CT coronary angiogram.  This did not show any significant obstructive disease but she does have calcified plaque in her LAD.      She does not smoke, drink or abuse drugs.  She denies any significant stresses at this time.  She exercises on a regular basis and she performs interval training on a treadmill.  She has no exertional chest discomfort or any " other symptoms suggestive of angina or heart failure.      Several nights ago, she woke up with a sudden sensation of shortness of breath and substernal chest discomfort.  These symptoms were a lot worse when she lied down but it was better when she sat up.  She tells me that her daughter and  had had viral illnesses with persistent coughing for the past 2 weeks.  She herself denies any fevers.  There is no history of any connective tissue disease.  She went to the emergency room.  I personally reviewed her EKG which was unremarkable.  She had a CT scan of her chest and there was no evidence of pulmonary embolism or any other acute abnormality.  Her ascending aorta was described as upper limits of normal in size.      Cardiac examination is normal today.  However, her pain continues.  She has been given Naprosyn and tramadol.      Overall, I do think this young lady does have acute pericarditis, possibly viral.  She was given a low dose of Naprosyn.  I have changed this to indomethacin 50 mg p.o. t.i.d.  I have also prescribed her Prilosec for gastric protection.  She has a history of hemorrhoids and has been having some bright red rectal bleeding in small amounts recently.  I definitely do not think this is upper GI related.  I reassured her of such.        We also discussed her coronary artery disease.  It would certainly be unusual for a young lady of 44 years old to have calcified plaque in the LAD.  However, her LDL is at 53 with an HDL of 59 with a total cholesterol of 128.  She watches her diet very carefully.  With these numbers, I am not sure of the therapeutic value of starting her on a statin.  As a compromise, I recommended red rice yeast extract.  I do not think she needs to be on an aspirin.      I will have her follow up with one of my colleagues, Mariaelena Turner, in a week's time and she will have echocardiography prior to clinic visit.  If her pain continues, perhaps we could add colchicine.  I  "think it would be advisable for her to complete a month's course of a nonsteroidal at a minimum if well tolerated.\"    Pericarditis been significantly improved since last visit.  She was ready feeling quite a bit better after a few days of high-dose nonsteroidals and did not follow-up with Jacquie.  She stopped high-dose nonsteroidals after about 2 weeks.  An added benefit was that the pain significantly relieved her discomfort from endometriosis as well.      She still has occasional left-sided breast pain on inspiration lasting no more than a minute.  She has a cough and she will get out of breath walking for about a mile and a half.  She would experience shortness of breath after walking about 5 minutes but she is able to power through her walk.  She has no heart failure symptoms.  She does have some aches and pains in her finger joints but no swelling.    She took red rice yeast extract for a while but this resulted in palpitations and she has since stopped taking them.    Her  travels extensively for work and she thinks he may have been exposed to COVID-19 in January.  She was wondering whether her pericarditis could have been due to COVID which I think is probably unlikely.  She would like to be tested and I reassured her that with her current lack of symptoms echo with reverse PCR test would not be helpful though a antibody test which may be available in the future could be helpful to see if she has been exposed.  Not sure whether this test is available in our state at this time.      General Appearance:    no distress, normal body habitus, upright.    ENT/Mouth:    membranes moist, no nasal discharge or bleeding gums. Normal head shape, no evidence of injury or laceration.    EYES:    no scleral icterus, normal conjunctivae    Neck:    no evidence of thyromegaly. Supple    Chest/Lungs:    No audible wheezing equal chest wall expansion. Non labored breathing. No cough.    Cardiovascular:    No evidence of " elevated jugular venous pressure. No evidence of pitting edema bilaterally    Abdomen:    no evidence of abdominal distention. No observed jaundice.    Extremities:    no cyanosis or clubbing noted.    Skin:    no xanthelasma, normal skin collar. No evidence of facial lacerations.    Neurologic:    Normal arm motion bilateral, no tremors. No evidence of focal defect.    Psychiatric:    alert and oriented x3, calm    Impression  1.  Recurrent pericarditis, resolved.  2.  Atypical chest discomfort, unlikely cardiac in origin.  3.  Shortness of breath on exertion, unlikely but may be an anginal equivalent.  Will perform exercise echocardiogram for further evaluation.  4.  Early onset coronary artery disease with positive calcium score.recommend twice a week low-dose statin to which patient is willing to try.  Will check lipid profile in 6 months at return clinic visit.  5.  History of probable TIA, asymptomatic.      She is under quite a bit of stress at this time due to the COVID pandemic and there is certainly a high probability that her symptoms may be stress related.  With her history of recurrent pericarditis and joint discomfort she may benefit from a rheumatologic opinion if her joint discomfort does not improve.    DR EDY SCANLON MD

## 2020-05-08 NOTE — LETTER
"5/8/2020      RE: Amanda Cox  [address] [place] [address]       Dear Colleague,    Thank you for the opportunity to participate in the care of your patient, Amanda Cox, at the [facility] at [facility]. Please see a copy of my visit note below.    Delightful 44-year-old lady who returns for follow-up of recurrent pericarditis.  History is as follows:    \"She is a very pleasant 44-year-old lady who had this condition that was diagnosed in 2005 when she was living in [place].  She does not recall all the details of that episode but does recall having a cardiac MR.  She does not recall what treatment was given.      In 2009, she was under a tremendous amount of stress and had a TIA.  Apparently, she had a small PFO but it was thought that her TIA may have been due to a birth control pill that she was on at the time.  She recalls a discussion as to whether she should be on warfarin but in the end, it was decided by a multispecialty team that she did not need to be on anything other than aspirin.  Fortunately, she has not had any further TIAs since then.      This lady does have a history of hypertension as well as migraines.  She takes propranolol which treats both conditions remarkably well and she has not had any migrainous attacks in 3 years.      She does have a very positive family history of premature atherosclerotic disease.  Her father had coronary artery disease diagnosed when he was 34.  She has multiple paternal uncles and aunts all with premature atherosclerotic disease as well.  In 2018, she had atypical chest discomfort and saw my former colleague, Dr. Isaiah Kirk, at [facility] [place].  He requested a CT coronary angiogram.  This did not show any significant obstructive disease but she does have calcified plaque in her LAD.      She does not smoke, drink or abuse drugs.  She denies any significant " stresses at this time.  She exercises on a regular basis and she performs interval training on a treadmill.  She has no exertional chest discomfort or any other symptoms suggestive of angina or heart failure.      Several nights ago, she woke up with a sudden sensation of shortness of breath and substernal chest discomfort.  These symptoms were a lot worse when she lied down but it was better when she sat up.  She tells me that her daughter and  had had viral illnesses with persistent coughing for the past 2 weeks.  She herself denies any fevers.  There is no history of any connective tissue disease.  She went to the emergency room.  I personally reviewed her EKG which was unremarkable.  She had a CT scan of her chest and there was no evidence of pulmonary embolism or any other acute abnormality.  Her ascending aorta was described as upper limits of normal in size.      Cardiac examination is normal today.  However, her pain continues.  She has been given Naprosyn and tramadol.      Overall, I do think this young lady does have acute pericarditis, possibly viral.  She was given a low dose of Naprosyn.  I have changed this to indomethacin 50 mg p.o. t.i.d.  I have also prescribed her Prilosec for gastric protection.  She has a history of hemorrhoids and has been having some bright red rectal bleeding in small amounts recently.  I definitely do not think this is upper GI related.  I reassured her of such.        We also discussed her coronary artery disease.  It would certainly be unusual for a young lady of 44 years old to have calcified plaque in the LAD.  However, her LDL is at 53 with an HDL of 59 with a total cholesterol of 128.  She watches her diet very carefully.  With these numbers, I am not sure of the therapeutic value of starting her on a statin.  As a compromise, I recommended red rice yeast extract.  I do not think she needs to be on an aspirin.      I will have her follow up with one of my  "colleagues, Mariaelena Turner, in a week's time and she will have echocardiography prior to clinic visit.  If her pain continues, perhaps we could add colchicine.  I think it would be advisable for her to complete a month's course of a nonsteroidal at a minimum if well tolerated.\"    Pericarditis been significantly improved since last visit.  She was ready feeling quite a bit better after a few days of high-dose nonsteroidals and did not follow-up with Jacquie.  She stopped high-dose nonsteroidals after about 2 weeks.  An added benefit was that the pain significantly relieved her discomfort from endometriosis as well.      She still has occasional left-sided breast pain on inspiration lasting no more than a minute.  She has a cough and she will get out of breath walking for about a mile and a half.  She would experience shortness of breath after walking about 5 minutes but she is able to power through her walk.  She has no heart failure symptoms.  She does have some aches and pains in her finger joints but no swelling.    She took red rice yeast extract for a while but this resulted in palpitations and she has since stopped taking them.    Her  travels extensively for work and she thinks he may have been exposed to COVID-19 in January.  She was wondering whether her pericarditis could have been due to COVID which I think is probably unlikely.  She would like to be tested and I reassured her that with her current lack of symptoms echo with reverse PCR test would not be helpful though a antibody test which may be available in the future could be helpful to see if she has been exposed.  Not sure whether this test is available in our state at this time.    General Appearance:    no distress, normal body habitus, upright.    ENT/Mouth:    membranes moist, no nasal discharge or bleeding gums. Normal head shape, no evidence of injury or laceration.    EYES:    no scleral icterus, normal conjunctivae    Neck:    no evidence " of thyromegaly. Supple    Chest/Lungs:    No audible wheezing equal chest wall expansion. Non labored breathing. No cough.    Cardiovascular:    No evidence of elevated jugular venous pressure. No evidence of pitting edema bilaterally    Abdomen:    no evidence of abdominal distention. No observed jaundice.    Extremities:    no cyanosis or clubbing noted.    Skin:    no xanthelasma, normal skin collar. No evidence of facial lacerations.    Neurologic:    Normal arm motion bilateral, no tremors. No evidence of focal defect.    Psychiatric:    alert and oriented x3, calm    Impression  1.  Recurrent pericarditis, resolved.  2.  Atypical chest discomfort, unlikely cardiac in origin.  3.  Shortness of breath on exertion, unlikely but may be an anginal equivalent.  Will perform exercise echocardiogram for further evaluation.  4.  Early onset coronary artery disease with positive calcium score.recommend twice a week low-dose statin to which patient is willing to try.  Will check lipid profile in 6 months at return clinic visit.  5.  History of probable TIA, asymptomatic.      She is under quite a bit of stress at this time due to the COVID pandemic and there is certainly a high probability that her symptoms may be stress related.  With her history of recurrent pericarditis and joint discomfort she may benefit from a rheumatologic opinion if her joint discomfort does not improve.    Please do not hesitate to contact me if you have any questions/concerns.     Sincerely,     DR EDY SCANLON MD

## 2020-05-26 ENCOUNTER — HOSPITAL ENCOUNTER (OUTPATIENT)
Dept: CARDIOLOGY | Facility: CLINIC | Age: 45
Discharge: HOME | End: 2020-05-26
Attending: INTERNAL MEDICINE | Admitting: INTERNAL MEDICINE
Payer: COMMERCIAL

## 2020-05-26 DIAGNOSIS — I30.0 ACUTE IDIOPATHIC PERICARDITIS: ICD-10-CM

## 2020-05-26 DIAGNOSIS — I25.10 CORONARY ARTERY DISEASE INVOLVING NATIVE CORONARY ARTERY OF NATIVE HEART WITHOUT ANGINA PECTORIS: ICD-10-CM

## 2020-05-26 DIAGNOSIS — R06.09 DOE (DYSPNEA ON EXERTION): ICD-10-CM

## 2020-05-26 PROCEDURE — 25500064 ZZH RX 255 OP 636: Performed by: INTERNAL MEDICINE

## 2020-05-26 PROCEDURE — 93016 CV STRESS TEST SUPVJ ONLY: CPT | Performed by: INTERNAL MEDICINE

## 2020-05-26 PROCEDURE — 93350 STRESS TTE ONLY: CPT | Mod: 26 | Performed by: INTERNAL MEDICINE

## 2020-05-26 PROCEDURE — 93321 DOPPLER ECHO F-UP/LMTD STD: CPT | Mod: 26 | Performed by: INTERNAL MEDICINE

## 2020-05-26 PROCEDURE — 93325 DOPPLER ECHO COLOR FLOW MAPG: CPT | Mod: 26 | Performed by: INTERNAL MEDICINE

## 2020-05-26 PROCEDURE — 93018 CV STRESS TEST I&R ONLY: CPT | Performed by: INTERNAL MEDICINE

## 2020-05-26 RX ADMIN — HUMAN ALBUMIN MICROSPHERES AND PERFLUTREN 3 ML: 10; .22 INJECTION, SOLUTION INTRAVENOUS at 10:52

## 2020-05-26 NOTE — PROGRESS NOTES
Stress echo completed. Ok to proceed with test with pt on propanolol Per Dr. Martinez. Contrast optison used for image enhancement.

## 2020-05-27 ENCOUNTER — TELEPHONE (OUTPATIENT)
Dept: CARDIOLOGY | Facility: CLINIC | Age: 45
End: 2020-05-27

## 2020-05-27 NOTE — TELEPHONE ENCOUNTER
Phone call to patient to inform  her of her stress echo results and to get an update on her symptomology. I told her that the stress test was reported as being normal ( she was on her beta blocker and so achieved THR of abut 85%). She said she was struggling to get her breath at the shey of the study and took her mask off to be able to breathe.  Her symptoms remain the same in that after 10 minutes of exercise she is short of breath and then after a short time she sort of gets her second wind. If she does real strenuous work she will get a sharp pain just under her left breast.  I told her that Dr. Walton will be reviewing her results saroj few days and once we get his feedback we will call her back. She had no other concerns or questions for me at this time.

## 2020-06-01 NOTE — TELEPHONE ENCOUNTER
Steco is adequate and normal.  Suspect mild symptoms due to physical deconditioning, no further cardiac work up needed at this time.   Thanks.  Dr. Walton     Contacted patient with Stress Echo results reviewed by Dr. Walton Patient verbalized understanding and agreed to plan of care.

## 2020-12-14 ENCOUNTER — AMBULATORY - HEALTHEAST (OUTPATIENT)
Dept: SURGERY | Facility: AMBULATORY SURGERY CENTER | Age: 45
End: 2020-12-14

## 2020-12-14 DIAGNOSIS — Z11.59 ENCOUNTER FOR SCREENING FOR OTHER VIRAL DISEASES: ICD-10-CM

## 2020-12-17 ENCOUNTER — AMBULATORY - HEALTHEAST (OUTPATIENT)
Dept: OBGYN | Facility: CLINIC | Age: 45
End: 2020-12-17

## 2020-12-17 DIAGNOSIS — Z47.1 AFTERCARE FOLLOWING OTHER JOINT REPLACEMENT SURGERY: ICD-10-CM

## 2020-12-17 DIAGNOSIS — N92.4 EXCESSIVE BLEEDING IN PREMENOPAUSAL PERIOD: ICD-10-CM

## 2020-12-17 DIAGNOSIS — Z96.698 AFTERCARE FOLLOWING OTHER JOINT REPLACEMENT SURGERY: ICD-10-CM

## 2020-12-17 ASSESSMENT — MIFFLIN-ST. JEOR: SCORE: 1392.51

## 2020-12-18 ENCOUNTER — HOSPITAL ENCOUNTER (OUTPATIENT)
Dept: LAB | Facility: CLINIC | Age: 45
Discharge: HOME | End: 2020-12-18
Attending: OBSTETRICS & GYNECOLOGY | Admitting: OBSTETRICS & GYNECOLOGY
Payer: COMMERCIAL

## 2020-12-18 ENCOUNTER — ANESTHESIA - HEALTHEAST (OUTPATIENT)
Dept: SURGERY | Facility: AMBULATORY SURGERY CENTER | Age: 45
End: 2020-12-18

## 2020-12-18 DIAGNOSIS — N92.4 EXCESSIVE BLEEDING IN PREMENOPAUSAL PERIOD: ICD-10-CM

## 2020-12-18 DIAGNOSIS — N92.4 EXCESSIVE BLEEDING IN PREMENOPAUSAL PERIOD: Primary | ICD-10-CM

## 2020-12-18 PROCEDURE — U0003 INFECTIOUS AGENT DETECTION BY NUCLEIC ACID (DNA OR RNA); SEVERE ACUTE RESPIRATORY SYNDROME CORONAVIRUS 2 (SARS-COV-2) (CORONAVIRUS DISEASE [COVID-19]), AMPLIFIED PROBE TECHNIQUE, MAKING USE OF HIGH THROUGHPUT TECHNOLOGIES AS DESCRIBED BY CMS-2020-01-R: HCPCS | Performed by: OBSTETRICS & GYNECOLOGY

## 2020-12-20 LAB
SARS-COV-2 RNA SPEC QL NAA+PROBE: NOT DETECTED
SPECIMEN SOURCE: NORMAL

## 2020-12-21 ENCOUNTER — SURGERY - HEALTHEAST (OUTPATIENT)
Dept: SURGERY | Facility: AMBULATORY SURGERY CENTER | Age: 45
End: 2020-12-21

## 2020-12-21 ASSESSMENT — MIFFLIN-ST. JEOR: SCORE: 1392.51

## 2021-01-09 ENCOUNTER — HEALTH MAINTENANCE LETTER (OUTPATIENT)
Age: 46
End: 2021-01-09

## 2021-05-08 ENCOUNTER — HEALTH MAINTENANCE LETTER (OUTPATIENT)
Age: 46
End: 2021-05-08

## 2021-06-05 VITALS — WEIGHT: 160 LBS | HEIGHT: 66 IN | BODY MASS INDEX: 25.71 KG/M2

## 2021-06-13 NOTE — ANESTHESIA CARE TRANSFER NOTE
Last vitals:   Vitals:    12/21/20 1424   BP: (P) 126/75   Pulse: (P) 78   Resp: (P) 16   Temp: (P) 36  C (96.8  F)   SpO2: (P) 100%     Patient's level of consciousness is drowsy  Spontaneous respirations: yes  Maintains airway independently: yes  Dentition unchanged: yes  Oropharynx: oropharynx clear of all foreign objects    QCDR Measures:  ASA# 20 - Surgical Safety Checklist: WHO surgical safety checklist completed prior to induction    PQRS# 430 - Adult PONV Prevention: 4558F - Pt received => 2 anti-emetic agents (different classes) preop & intraop  ASA# 8 - Peds PONV Prevention: NA - Not pediatric patient, not GA or 2 or more risk factors NOT present  PQRS# 424 - Martina-op Temp Management: 4559F - At least one body temp DOCUMENTED => 35.5C or 95.9F within required timeframe  PQRS# 426 - PACU Transfer Protocol: - Transfer of care checklist used  ASA# 14 - Acute Post-op Pain: ASA14B - Patient did NOT experience pain >= 7 out of 10

## 2021-06-13 NOTE — ANESTHESIA POSTPROCEDURE EVALUATION
Patient: Amanda Cox  Procedure(s):  HYSTEROSCOPY, WITH DILATION AND CURETTAGE, NOVASURE ABLATION  NOVASURE ABLATION  Anesthesia type: MAC    Patient location: PACU  Last vitals:   Vitals Value Taken Time   /89 12/21/20 1500   Temp 36  C (96.8  F) 12/21/20 1424   Pulse 70 12/21/20 1510   Resp 16 12/21/20 1430   SpO2 99 % 12/21/20 1510   Vitals shown include unvalidated device data.  Post vital signs: stable  Level of consciousness: awake and responds to simple questions  Post-anesthesia pain: pain controlled  Post-anesthesia nausea and vomiting: no  Pulmonary: unassisted, return to baseline  Cardiovascular: stable and blood pressure at baseline  Hydration: adequate  Anesthetic events: no    QCDR Measures:  ASA# 11 - Martina-op Cardiac Arrest: ASA11B - Patient did NOT experience unanticipated cardiac arrest  ASA# 12 - Martina-op Mortality Rate: ASA12B - Patient did NOT die  ASA# 13 - PACU Re-Intubation Rate: NA - No ETT / LMA used for case  ASA# 10 - Composite Anes Safety: ASA10A - No serious adverse event    Additional Notes:

## 2021-06-13 NOTE — ANESTHESIA PREPROCEDURE EVALUATION
Anesthesia Evaluation      Patient summary reviewed     Airway   Mallampati: II  Neck ROM: full   Pulmonary     breath sounds clear to auscultation  (+) asthma                           Cardiovascular   Exercise tolerance: > or = 4 METS  Rhythm: regular  Rate: normal,         Neuro/Psych    (+) CVA ,     Endo/Other - negative ROS      GI/Hepatic/Renal       Other findings: Dysfunctional uterine bleeding      Dental - normal exam                        Anesthesia Plan  Planned anesthetic: MAC    ASA 2   Induction: intravenous   Anesthetic plan and risks discussed with: patient  Anesthesia plan special considerations: antiemetics,   Post-op plan: routine recovery

## 2021-10-23 ENCOUNTER — HEALTH MAINTENANCE LETTER (OUTPATIENT)
Age: 46
End: 2021-10-23

## 2022-06-04 ENCOUNTER — HEALTH MAINTENANCE LETTER (OUTPATIENT)
Age: 47
End: 2022-06-04

## 2022-10-09 ENCOUNTER — HEALTH MAINTENANCE LETTER (OUTPATIENT)
Age: 47
End: 2022-10-09

## 2023-06-10 ENCOUNTER — HEALTH MAINTENANCE LETTER (OUTPATIENT)
Age: 48
End: 2023-06-10

## 2024-06-17 PROBLEM — Z76.89 HEALTH CARE HOME: Status: RESOLVED | Noted: 2018-07-12 | Resolved: 2024-06-17
